# Patient Record
Sex: FEMALE | Race: WHITE | NOT HISPANIC OR LATINO
[De-identification: names, ages, dates, MRNs, and addresses within clinical notes are randomized per-mention and may not be internally consistent; named-entity substitution may affect disease eponyms.]

---

## 2017-02-21 ENCOUNTER — RESULT CHARGE (OUTPATIENT)
Age: 27
End: 2017-02-21

## 2017-02-21 ENCOUNTER — OTHER (OUTPATIENT)
Age: 27
End: 2017-02-21

## 2017-02-21 ENCOUNTER — APPOINTMENT (OUTPATIENT)
Dept: UROLOGY | Facility: CLINIC | Age: 27
End: 2017-02-21

## 2017-02-21 VITALS
HEART RATE: 70 BPM | SYSTOLIC BLOOD PRESSURE: 111 MMHG | BODY MASS INDEX: 22.08 KG/M2 | HEIGHT: 62 IN | TEMPERATURE: 98.2 F | WEIGHT: 120 LBS | DIASTOLIC BLOOD PRESSURE: 74 MMHG

## 2017-02-21 DIAGNOSIS — R35.1 NOCTURIA: ICD-10-CM

## 2017-02-21 DIAGNOSIS — Z80.3 FAMILY HISTORY OF MALIGNANT NEOPLASM OF BREAST: ICD-10-CM

## 2017-02-21 DIAGNOSIS — Z78.9 OTHER SPECIFIED HEALTH STATUS: ICD-10-CM

## 2017-02-21 DIAGNOSIS — R35.0 FREQUENCY OF MICTURITION: ICD-10-CM

## 2017-03-08 ENCOUNTER — APPOINTMENT (OUTPATIENT)
Dept: UROLOGY | Facility: HOSPITAL | Age: 27
End: 2017-03-08

## 2018-04-20 ENCOUNTER — RESULT REVIEW (OUTPATIENT)
Age: 28
End: 2018-04-20

## 2019-04-15 ENCOUNTER — RX RENEWAL (OUTPATIENT)
Age: 29
End: 2019-04-15

## 2019-04-25 ENCOUNTER — RECORD ABSTRACTING (OUTPATIENT)
Age: 29
End: 2019-04-25

## 2019-04-25 DIAGNOSIS — B00.9 HERPESVIRAL INFECTION, UNSPECIFIED: ICD-10-CM

## 2019-04-25 DIAGNOSIS — Q83.8 OTHER CONGENITAL MALFORMATIONS OF BREAST: ICD-10-CM

## 2019-04-25 DIAGNOSIS — Z82.49 FAMILY HISTORY OF ISCHEMIC HEART DISEASE AND OTHER DISEASES OF THE CIRCULATORY SYSTEM: ICD-10-CM

## 2019-04-25 LAB
CYTOLOGY CVX/VAG DOC THIN PREP: NORMAL
CYTOLOGY CVX/VAG DOC THIN PREP: NORMAL

## 2019-04-27 ENCOUNTER — RESULT CHARGE (OUTPATIENT)
Age: 29
End: 2019-04-27

## 2019-04-27 ENCOUNTER — APPOINTMENT (OUTPATIENT)
Dept: OBGYN | Facility: CLINIC | Age: 29
End: 2019-04-27
Payer: COMMERCIAL

## 2019-04-27 VITALS
HEIGHT: 62 IN | BODY MASS INDEX: 23.92 KG/M2 | WEIGHT: 130 LBS | DIASTOLIC BLOOD PRESSURE: 70 MMHG | SYSTOLIC BLOOD PRESSURE: 112 MMHG

## 2019-04-27 DIAGNOSIS — F40.298 OTHER SPECIFIED PHOBIA: ICD-10-CM

## 2019-04-27 DIAGNOSIS — B97.7 PAPILLOMAVIRUS AS THE CAUSE OF DISEASES CLASSIFIED ELSEWHERE: ICD-10-CM

## 2019-04-27 DIAGNOSIS — Z01.411 ENCOUNTER FOR GYNECOLOGICAL EXAMINATION (GENERAL) (ROUTINE) WITH ABNORMAL FINDINGS: ICD-10-CM

## 2019-04-27 LAB
BILIRUB UR QL STRIP: NORMAL
CLARITY UR: CLEAR
COLLECTION METHOD: NORMAL
GLUCOSE UR-MCNC: NORMAL
HCG UR QL: 0.2 EU/DL
HCG UR QL: NEGATIVE
HGB UR QL STRIP.AUTO: NORMAL
KETONES UR-MCNC: NORMAL
LEUKOCYTE ESTERASE UR QL STRIP: NORMAL
NITRITE UR QL STRIP: NORMAL
PH UR STRIP: 5.5
PROT UR STRIP-MCNC: NORMAL
QUALITY CONTROL: YES
SP GR UR STRIP: 1.02

## 2019-04-27 PROCEDURE — 81003 URINALYSIS AUTO W/O SCOPE: CPT | Mod: QW

## 2019-04-27 PROCEDURE — 99395 PREV VISIT EST AGE 18-39: CPT

## 2019-04-27 PROCEDURE — 81025 URINE PREGNANCY TEST: CPT

## 2019-04-27 NOTE — HISTORY OF PRESENT ILLNESS
[1 Year Ago] : 1 year ago [Good] : being in good health [Healthy Diet] : a healthy diet [Regular Exercise] : regular exercise [Last Pap ___] : Last cervical pap smear was [unfilled] [Reproductive Age] : is of reproductive age [Definite:  ___ (Date)] : the last menstrual period was [unfilled] [Normal Amount/Duration] : was of a normal amount and duration [Regular Cycle Intervals] : periods have been regular [Menarche Age: ____] : age at menarche was [unfilled] [Sexually Active] : is sexually active [Contraception] : uses contraception [Oral Contraceptives] : uses oral contraceptives [Male ___] : [unfilled] male [Weight Concerns] : no concerns with her weight [de-identified] : Pelvic US 10/21/2017

## 2019-04-29 LAB — HPV HIGH+LOW RISK DNA PNL CVX: NOT DETECTED

## 2019-05-01 LAB — CYTOLOGY CVX/VAG DOC THIN PREP: NORMAL

## 2019-05-05 ENCOUNTER — TRANSCRIPTION ENCOUNTER (OUTPATIENT)
Age: 29
End: 2019-05-05

## 2019-07-30 ENCOUNTER — RX RENEWAL (OUTPATIENT)
Age: 29
End: 2019-07-30

## 2019-09-16 ENCOUNTER — APPOINTMENT (OUTPATIENT)
Dept: ORTHOPEDIC SURGERY | Facility: CLINIC | Age: 29
End: 2019-09-16

## 2020-01-21 ENCOUNTER — TRANSCRIPTION ENCOUNTER (OUTPATIENT)
Age: 30
End: 2020-01-21

## 2020-02-25 ENCOUNTER — APPOINTMENT (OUTPATIENT)
Dept: OBGYN | Facility: CLINIC | Age: 30
End: 2020-02-25
Payer: COMMERCIAL

## 2020-02-25 VITALS
SYSTOLIC BLOOD PRESSURE: 104 MMHG | BODY MASS INDEX: 23.74 KG/M2 | HEIGHT: 62 IN | WEIGHT: 129 LBS | DIASTOLIC BLOOD PRESSURE: 70 MMHG

## 2020-02-25 DIAGNOSIS — R39.15 URGENCY OF URINATION: ICD-10-CM

## 2020-02-25 DIAGNOSIS — Z00.00 ENCOUNTER FOR GENERAL ADULT MEDICAL EXAMINATION W/OUT ABNORMAL FINDINGS: ICD-10-CM

## 2020-02-25 LAB
BILIRUB UR QL STRIP: NORMAL
GLUCOSE UR-MCNC: NORMAL
HCG UR QL: 0.2 EU/DL
HGB UR QL STRIP.AUTO: ABNORMAL
KETONES UR-MCNC: NORMAL
LEUKOCYTE ESTERASE UR QL STRIP: NORMAL
NITRITE UR QL STRIP: NORMAL
PH UR STRIP: 5.5
PROT UR STRIP-MCNC: NORMAL
SP GR UR STRIP: 1.01

## 2020-02-25 PROCEDURE — 99213 OFFICE O/P EST LOW 20 MIN: CPT

## 2020-02-25 PROCEDURE — 81003 URINALYSIS AUTO W/O SCOPE: CPT | Mod: QW

## 2020-02-25 NOTE — PHYSICAL EXAM
[Labia Majora] : labia major [Labia Minora] : labia minora [Normal] : clitoris [No Bleeding] : there was no active vaginal bleeding [Uterine Adnexae] : were not tender and not enlarged [FreeTextEntry4] : small amount white discharge,  cultues preformed.

## 2020-02-25 NOTE — HISTORY OF PRESENT ILLNESS
[1 Year Ago] : 1 year ago [Good] : being in good health [Healthy Diet] : a healthy diet [Regular Exercise] : regular exercise [Last Pap ___] : Last cervical pap smear was [unfilled] [Reproductive Age] : is of reproductive age [Menarche Age: ____] : age at menarche was [unfilled] [Sexually Active] : is sexually active [Contraception] : uses contraception [Oral Contraceptives] : uses oral contraceptives [Pregnancy History] : denies prior pregnancies

## 2020-02-25 NOTE — DISCUSSION/SUMMARY
[Combined Oral Contraception] : combined oral contraception [FreeTextEntry1] : Pt aware pending culture results and TVS any further tx.  PT scheduling TVS to further evaluate symptoms.  All the pt's questions and concerns were addressed.

## 2020-02-26 ENCOUNTER — APPOINTMENT (OUTPATIENT)
Dept: OBGYN | Facility: CLINIC | Age: 30
End: 2020-02-26
Payer: COMMERCIAL

## 2020-02-26 ENCOUNTER — ASOB RESULT (OUTPATIENT)
Age: 30
End: 2020-02-26

## 2020-02-26 LAB
APPEARANCE: CLEAR
BACTERIA: NEGATIVE
BILIRUBIN URINE: NEGATIVE
BLOOD URINE: ABNORMAL
COLOR: COLORLESS
GLUCOSE QUALITATIVE U: NEGATIVE
HYALINE CASTS: 1 /LPF
KETONES URINE: NEGATIVE
LEUKOCYTE ESTERASE URINE: NEGATIVE
MICROSCOPIC-UA: NORMAL
NITRITE URINE: NEGATIVE
PH URINE: 6
PROTEIN URINE: NEGATIVE
RED BLOOD CELLS URINE: 1 /HPF
SPECIFIC GRAVITY URINE: 1.01
SQUAMOUS EPITHELIAL CELLS: 0 /HPF
UROBILINOGEN URINE: NORMAL
WHITE BLOOD CELLS URINE: 1 /HPF

## 2020-02-26 PROCEDURE — 76830 TRANSVAGINAL US NON-OB: CPT

## 2020-02-27 LAB
BACTERIA UR CULT: NORMAL
C TRACH RRNA SPEC QL NAA+PROBE: NOT DETECTED
CANDIDA VAG CYTO: NOT DETECTED
G VAGINALIS+PREV SP MTYP VAG QL MICRO: NOT DETECTED
N GONORRHOEA RRNA SPEC QL NAA+PROBE: NOT DETECTED
SOURCE AMPLIFICATION: NORMAL
T VAGINALIS VAG QL WET PREP: NOT DETECTED

## 2020-03-04 ENCOUNTER — TRANSCRIPTION ENCOUNTER (OUTPATIENT)
Age: 30
End: 2020-03-04

## 2020-10-05 ENCOUNTER — APPOINTMENT (OUTPATIENT)
Dept: OBGYN | Facility: CLINIC | Age: 30
End: 2020-10-05
Payer: COMMERCIAL

## 2020-10-05 VITALS
TEMPERATURE: 97.3 F | WEIGHT: 127 LBS | DIASTOLIC BLOOD PRESSURE: 62 MMHG | SYSTOLIC BLOOD PRESSURE: 114 MMHG | BODY MASS INDEX: 23.37 KG/M2 | HEIGHT: 62 IN

## 2020-10-05 DIAGNOSIS — Z92.89 PERSONAL HISTORY OF OTHER MEDICAL TREATMENT: ICD-10-CM

## 2020-10-05 DIAGNOSIS — Z01.419 ENCOUNTER FOR GYNECOLOGICAL EXAMINATION (GENERAL) (ROUTINE) W/OUT ABNORMAL FINDINGS: ICD-10-CM

## 2020-10-05 PROCEDURE — 99395 PREV VISIT EST AGE 18-39: CPT

## 2020-10-08 LAB — HPV HIGH+LOW RISK DNA PNL CVX: NOT DETECTED

## 2020-10-25 PROBLEM — Z92.89 H/O PELVIC ULTRASOUND: Status: RESOLVED | Noted: 2019-04-25 | Resolved: 2020-10-25

## 2020-10-25 NOTE — HISTORY OF PRESENT ILLNESS
[N] : Patient denies prior pregnancies [Currently Active] : currently active [Men] : men [Vaginal] : vaginal [No] : No [TextBox_4] : 30-year-old G0 LMP 9/15/2020 presents for annual gynecological exam requesting to continue oral contraceptive\par \par She is failed multiple other oral contraceptives in the past and has tried over 4 different regimens\par \par She has complaints pain with sex-we discussed the differentials-causes \par \par she will add coconut oil and call for any increasing or persisting symptoms symptoms [PapSmeardate] : 4/27/2019 [TextBox_31] : negative [GonorrheaDate] : 2/25/2020 [TextBox_63] : not detected [ChlamydiaDate] : 2/25/2020 [TextBox_68] : not detected [HPVDate] : 4/27/2019 [TextBox_78] : not detected [LMPDate] : 09/15/202 [PGHxTotal] : 0 [TextBox_6] : 09/15/2020 [FreeTextEntry1] : 09/15/2020

## 2020-10-25 NOTE — COUNSELING
[Nutrition/ Exercise/ Weight Management] : nutrition, exercise, weight management [Vitamins/Supplements] : vitamins/supplements [Sunscreen] : sunscreen [Breast Self Exam] : breast self exam [Contraception/ Emergency Contraception/ Safe Sexual Practices] : contraception, emergency contraception, safe sexual practices [STD (testing, results, tx)] : STD (testing, results, tx)

## 2020-10-25 NOTE — PHYSICAL EXAM
[Appropriately responsive] : appropriately responsive [Alert] : alert [No Acute Distress] : no acute distress [No Lymphadenopathy] : no lymphadenopathy [Regular Rate Rhythm] : regular rate rhythm [No Murmurs] : no murmurs [Clear to Auscultation B/L] : clear to auscultation bilaterally [Soft] : soft [Non-tender] : non-tender [Non-distended] : non-distended [No HSM] : No HSM [No Lesions] : no lesions [No Mass] : no mass [Oriented x3] : oriented x3 [Examination Of The Breasts] : a normal appearance [Normal] : normal [No Masses] : no breast masses were palpable

## 2020-11-23 LAB — CYTOLOGY CVX/VAG DOC THIN PREP: NORMAL

## 2020-12-23 PROBLEM — Z01.419 ENCOUNTER FOR ANNUAL ROUTINE GYNECOLOGICAL EXAMINATION: Status: RESOLVED | Noted: 2020-10-05 | Resolved: 2020-12-23

## 2021-02-13 ENCOUNTER — TRANSCRIPTION ENCOUNTER (OUTPATIENT)
Age: 31
End: 2021-02-13

## 2021-04-22 ENCOUNTER — TRANSCRIPTION ENCOUNTER (OUTPATIENT)
Age: 31
End: 2021-04-22

## 2021-05-01 ENCOUNTER — TRANSCRIPTION ENCOUNTER (OUTPATIENT)
Age: 31
End: 2021-05-01

## 2021-05-12 ENCOUNTER — TRANSCRIPTION ENCOUNTER (OUTPATIENT)
Age: 31
End: 2021-05-12

## 2021-05-18 ENCOUNTER — APPOINTMENT (OUTPATIENT)
Dept: INTERNAL MEDICINE | Facility: CLINIC | Age: 31
End: 2021-05-18
Payer: COMMERCIAL

## 2021-05-18 VITALS
RESPIRATION RATE: 16 BRPM | OXYGEN SATURATION: 99 % | HEART RATE: 77 BPM | SYSTOLIC BLOOD PRESSURE: 110 MMHG | BODY MASS INDEX: 23.6 KG/M2 | WEIGHT: 125 LBS | DIASTOLIC BLOOD PRESSURE: 70 MMHG | HEIGHT: 61 IN | TEMPERATURE: 99.1 F

## 2021-05-18 DIAGNOSIS — L60.0 INGROWING NAIL: ICD-10-CM

## 2021-05-18 DIAGNOSIS — R21 RASH AND OTHER NONSPECIFIC SKIN ERUPTION: ICD-10-CM

## 2021-05-18 PROCEDURE — 99072 ADDL SUPL MATRL&STAF TM PHE: CPT

## 2021-05-18 PROCEDURE — 99204 OFFICE O/P NEW MOD 45 MIN: CPT

## 2021-05-18 NOTE — HEALTH RISK ASSESSMENT
[Monthly or less (1 pt)] : Monthly or less (1 point) [1 or 2 (0 pts)] : 1 or 2 (0 points) [No] : In the past 12 months have you used drugs other than those required for medical reasons? No [0] : 2) Feeling down, depressed, or hopeless: Not at all (0) [] : No [KFD4Mxzrb] : 0

## 2021-05-18 NOTE — HISTORY OF PRESENT ILLNESS
[FreeTextEntry8] : Ms. FLORY PERES  is    31 year female . \par pt. is here today with a c/o rash on her chest and upper back.\par pt. stated she applied tanning lotion and put her fitting sweat shirt on later in the day she noticed reddish rash.\par she have been applying hydrocortisone and taking  Benadryl, it has helped a little.\par she also c/o of swelling of the right big toe nail bed since end of april .\par she have been to the urgent care and they have prescribed clindamycin 300 mg tid for 5 days which didn't help.\par she is asking for referral  to see  podiatrist.

## 2021-05-18 NOTE — PHYSICAL EXAM
[Normal] : normal rate, regular rhythm, normal S1 and S2 and no murmur heard [de-identified] : mild swelling of the surrounding are of the right big toe nail bed.right big toe nail looks yellow. [de-identified] : reddish maculopapular eruption on the chest and upper back.

## 2021-05-18 NOTE — REVIEW OF SYSTEMS
[Itching] : Itching [Skin Rash] : skin rash [Negative] : Respiratory [de-identified] : swelling of the right big toe nail bed.

## 2021-05-18 NOTE — ASSESSMENT
[FreeTextEntry1] : ingrown toe nail infection:\par will treat with keflex 500 mg bid for 7 days .\par will send a referral to podiatry for evaluation.\par \par Rash:\par secondary to tanning lotion.\par advised pt. to avoid the lotion .\par apply calamine lotion bid and alovera gel .\par continue Benadryl as needed for itching.\par \par

## 2021-05-26 ENCOUNTER — TRANSCRIPTION ENCOUNTER (OUTPATIENT)
Age: 31
End: 2021-05-26

## 2021-05-30 ENCOUNTER — TRANSCRIPTION ENCOUNTER (OUTPATIENT)
Age: 31
End: 2021-05-30

## 2021-06-02 ENCOUNTER — TRANSCRIPTION ENCOUNTER (OUTPATIENT)
Age: 31
End: 2021-06-02

## 2021-06-06 ENCOUNTER — TRANSCRIPTION ENCOUNTER (OUTPATIENT)
Age: 31
End: 2021-06-06

## 2021-06-25 ENCOUNTER — TRANSCRIPTION ENCOUNTER (OUTPATIENT)
Age: 31
End: 2021-06-25

## 2021-07-05 ENCOUNTER — TRANSCRIPTION ENCOUNTER (OUTPATIENT)
Age: 31
End: 2021-07-05

## 2021-07-30 ENCOUNTER — TRANSCRIPTION ENCOUNTER (OUTPATIENT)
Age: 31
End: 2021-07-30

## 2021-08-01 ENCOUNTER — TRANSCRIPTION ENCOUNTER (OUTPATIENT)
Age: 31
End: 2021-08-01

## 2021-08-24 ENCOUNTER — TRANSCRIPTION ENCOUNTER (OUTPATIENT)
Age: 31
End: 2021-08-24

## 2021-09-02 ENCOUNTER — APPOINTMENT (OUTPATIENT)
Dept: INTERNAL MEDICINE | Facility: CLINIC | Age: 31
End: 2021-09-02
Payer: COMMERCIAL

## 2021-09-02 DIAGNOSIS — T50.B95A ADVERSE EFFECT OF OTHER VIRAL VACCINES, INITIAL ENCOUNTER: ICD-10-CM

## 2021-09-02 PROCEDURE — 99203 OFFICE O/P NEW LOW 30 MIN: CPT | Mod: 95

## 2021-09-03 ENCOUNTER — TRANSCRIPTION ENCOUNTER (OUTPATIENT)
Age: 31
End: 2021-09-03

## 2021-09-03 PROBLEM — T50.B95A ADVERSE EFFECT OF COVID-19 VACCINE: Status: ACTIVE | Noted: 2021-09-03

## 2021-09-03 NOTE — HISTORY OF PRESENT ILLNESS
[Home] : at home, [unfilled] , at the time of the visit. [Medical Office: ([facility])___] : at the medical office located in  [Verbal consent obtained from patient] : the patient, [unfilled] [FreeTextEntry8] : Ms. PERES seen today for s/e to the covid vaccine.\par pt. stated she got her 2 dose of pfizer vaccine yesterday, she was very anxious to get the vaccine and right after she got the vaccine she was lightheaded , sweating , vomited .\par She was taken to the ER, then she went home with her dad after one hr.\par Today she is still filling little nauseous, body ache. no fever.\par \par

## 2021-09-03 NOTE — REVIEW OF SYSTEMS
[Fatigue] : fatigue [Nausea] : nausea [Muscle Pain] : muscle pain [Negative] : Integumentary [Fever] : no fever [Chills] : no chills [Hot Flashes] : no hot flashes [Night Sweats] : no night sweats [Abdominal Pain] : no abdominal pain [Diarrhea] : diarrhea [Vomiting] : no vomiting [Heartburn] : no heartburn

## 2021-09-03 NOTE — ASSESSMENT
[FreeTextEntry1] : Ms. PERES seen today for s/e to the covid vaccine.\par She got her 2 dose of pfizer vaccine yesterday and  she was lightheaded , sweating and she  vomited right after.\par She was taken to the ER,pt. felt little better went home with her dad.\par Today she is still filling little nauseous, body ache. no fever, she is not feeling well.\par She is concern if she is allergic to the vaccine.\par Reassurance given to the pt.\par she had tolerated the first dose , didn't had any reaction , except the injection site was hurting her for 2 to 3 wks.\par she have no rash , itching, coughing , SOB , swelling of tongue.\par advised to increase fluid intake.\par Tylenol prn for fever, body ache , jt, pain.\par Avoid oily, spicy food.\par

## 2021-09-23 ENCOUNTER — TRANSCRIPTION ENCOUNTER (OUTPATIENT)
Age: 31
End: 2021-09-23

## 2021-10-04 ENCOUNTER — ASOB RESULT (OUTPATIENT)
Age: 31
End: 2021-10-04

## 2021-10-04 ENCOUNTER — NON-APPOINTMENT (OUTPATIENT)
Age: 31
End: 2021-10-04

## 2021-10-04 ENCOUNTER — APPOINTMENT (OUTPATIENT)
Dept: OBGYN | Facility: CLINIC | Age: 31
End: 2021-10-04
Payer: COMMERCIAL

## 2021-10-04 VITALS
BODY MASS INDEX: 23.55 KG/M2 | SYSTOLIC BLOOD PRESSURE: 108 MMHG | HEIGHT: 62 IN | DIASTOLIC BLOOD PRESSURE: 64 MMHG | WEIGHT: 128 LBS

## 2021-10-04 DIAGNOSIS — R10.2 PELVIC AND PERINEAL PAIN: ICD-10-CM

## 2021-10-04 DIAGNOSIS — Z32.02 ENCOUNTER FOR PREGNANCY TEST, RESULT NEGATIVE: ICD-10-CM

## 2021-10-04 PROCEDURE — 81003 URINALYSIS AUTO W/O SCOPE: CPT | Mod: QW

## 2021-10-04 PROCEDURE — 81025 URINE PREGNANCY TEST: CPT

## 2021-10-04 PROCEDURE — 99213 OFFICE O/P EST LOW 20 MIN: CPT | Mod: 25

## 2021-10-04 PROCEDURE — 76830 TRANSVAGINAL US NON-OB: CPT

## 2021-10-05 ENCOUNTER — APPOINTMENT (OUTPATIENT)
Dept: INTERNAL MEDICINE | Facility: CLINIC | Age: 31
End: 2021-10-05
Payer: COMMERCIAL

## 2021-10-05 VITALS
RESPIRATION RATE: 16 BRPM | DIASTOLIC BLOOD PRESSURE: 80 MMHG | BODY MASS INDEX: 23.55 KG/M2 | HEART RATE: 85 BPM | HEIGHT: 62 IN | WEIGHT: 128 LBS | TEMPERATURE: 98.3 F | OXYGEN SATURATION: 98 % | SYSTOLIC BLOOD PRESSURE: 112 MMHG

## 2021-10-05 DIAGNOSIS — N30.01 ACUTE CYSTITIS WITH HEMATURIA: ICD-10-CM

## 2021-10-05 DIAGNOSIS — Z23 ENCOUNTER FOR IMMUNIZATION: ICD-10-CM

## 2021-10-05 PROBLEM — Z32.02 ENCOUNTER FOR PREGNANCY TEST, RESULT NEGATIVE: Status: ACTIVE | Noted: 2021-10-05

## 2021-10-05 LAB
BILIRUB UR QL STRIP: NEGATIVE
BILIRUB UR QL STRIP: NORMAL
CLARITY UR: NORMAL
COLLECTION METHOD: NORMAL
GLUCOSE UR-MCNC: NEGATIVE
GLUCOSE UR-MCNC: NORMAL
HCG UR QL: 0.2 EU/DL
HCG UR QL: 0.2 EU/DL
HCG UR QL: NEGATIVE
HGB UR QL STRIP.AUTO: ABNORMAL
HGB UR QL STRIP.AUTO: NORMAL
KETONES UR-MCNC: NEGATIVE
KETONES UR-MCNC: NORMAL
LEUKOCYTE ESTERASE UR QL STRIP: NEGATIVE
LEUKOCYTE ESTERASE UR QL STRIP: NORMAL
NITRITE UR QL STRIP: NEGATIVE
NITRITE UR QL STRIP: NORMAL
PH UR STRIP: 5
PH UR STRIP: 5.5
PROT UR STRIP-MCNC: NEGATIVE
PROT UR STRIP-MCNC: NORMAL
QUALITY CONTROL: YES
SP GR UR STRIP: 1.03
SP GR UR STRIP: 1.03

## 2021-10-05 PROCEDURE — 99214 OFFICE O/P EST MOD 30 MIN: CPT | Mod: 25

## 2021-10-05 PROCEDURE — 81003 URINALYSIS AUTO W/O SCOPE: CPT | Mod: QW

## 2021-10-05 NOTE — HISTORY OF PRESENT ILLNESS
[FreeTextEntry8] : Ms. PERES  presents with a c/o pain in the suprapubic area.\par she tells me that Saturday she went out to eat dinner with her friends where she had sharp pain in the lower abdomen , she also vomited once.\par she went to see her GYN , had a trans vaginal US , everything was normal.\par She says she don't have any pain, just feels pressure in the lower abdomen.\par also c/o pain in the neck going down to both side if the neck and up to the back of the head , gives her HA everday.\par It all started 2 wks ago.\par

## 2021-10-05 NOTE — REVIEW OF SYSTEMS
[Fever] : no fever [Chills] : no chills [Abdominal Pain] : abdominal pain [Nausea] : nausea [Constipation] : no constipation [Diarrhea] : diarrhea [Vomiting] : vomiting [Heartburn] : no heartburn [Melena] : no melena [Dysuria] : no dysuria [Hematuria] : no hematuria [Frequency] : no frequency [Vaginal Discharge] : vaginal discharge

## 2021-10-05 NOTE — DISCUSSION/SUMMARY
[FreeTextEntry1] : We reviewed her pelvic sonogram which does now show an etiology for the symptoms she is describing. I explained that taking 2 OCPs also would not cause her symptoms, but did advise that in the event of missed doses she should use a back-up method for 1 week to prevent pregnancy. \par I advised GI evaluation if her symptoms persist.\par Will send urine culture to rule out UTI.

## 2021-10-05 NOTE — PHYSICAL EXAM
[Soft] : abdomen soft [Non-distended] : non-distended [No Masses] : no abdominal mass palpated [Normal Bowel Sounds] : normal bowel sounds [Normal] : no joint swelling and grossly normal strength and tone [de-identified] : +pressure in the suprapubic area.

## 2021-10-05 NOTE — REVIEW OF SYSTEMS
[Fever] : no fever [Chills] : no chills [Abdominal Pain] : abdominal pain [Diarrhea] : diarrhea [Vomiting] : vomiting

## 2021-10-05 NOTE — HISTORY OF PRESENT ILLNESS
[Patient reported PAP Smear was normal] : Patient reported PAP Smear was normal [Gonorrhea test offered] : Gonorrhea test offered [Chlamydia test offered] : Chlamydia test offered [HPV test offered] : HPV test offered [N] : Patient reports normal menses [Oral Contraceptive] : uses oral contraception pills [Y] : Patient is sexually active [Monogamous (Male Partner)] : is monogamous with a male partner [Menarche Age: ____] : age at menarche was [unfilled] [Currently Active] : currently active [Men] : men [Vaginal] : vaginal [No] : No [Yes] : Yes [TextBox_4] : Pt presents for follow up sono results. C/o suprapubic pressure [PapSmeardate] : 10/05/20 [TextBox_31] : neg [GonorrheaDate] : 02/25/20 [TextBox_63] : neg [ChlamydiaDate] : 02/25/20 [TextBox_68] : neg [HPVDate] : 10/05/20 [TextBox_78] : neg [LMPDate] : 09/21/21 [PGHxTotal] : 0 [FreeTextEntry1] : 09/21/21

## 2021-10-05 NOTE — ASSESSMENT
[FreeTextEntry1] : # acute cystitis with hematuria:\par prescribed ciprofloxacin 500 mg bid for 10 days.\par drink plenty of water.\par \par # cervical radiculitis:\par prescribed diclofenac ointment 1 % apply locally 2 to 3 times a day/\par warm compression.\par demonstrated neck muscle strengthening exercise.\par avoid pillow if sleeping on back.\par

## 2021-10-08 ENCOUNTER — TRANSCRIPTION ENCOUNTER (OUTPATIENT)
Age: 31
End: 2021-10-08

## 2021-10-09 ENCOUNTER — NON-APPOINTMENT (OUTPATIENT)
Age: 31
End: 2021-10-09

## 2021-10-09 ENCOUNTER — APPOINTMENT (OUTPATIENT)
Dept: OBGYN | Facility: CLINIC | Age: 31
End: 2021-10-09
Payer: COMMERCIAL

## 2021-10-09 VITALS
TEMPERATURE: 97.5 F | SYSTOLIC BLOOD PRESSURE: 98 MMHG | DIASTOLIC BLOOD PRESSURE: 62 MMHG | WEIGHT: 127.38 LBS | BODY MASS INDEX: 23.44 KG/M2 | HEIGHT: 62 IN

## 2021-10-09 DIAGNOSIS — Z30.41 ENCOUNTER FOR SURVEILLANCE OF CONTRACEPTIVE PILLS: ICD-10-CM

## 2021-10-09 DIAGNOSIS — Z01.419 ENCOUNTER FOR GYNECOLOGICAL EXAMINATION (GENERAL) (ROUTINE) W/OUT ABNORMAL FINDINGS: ICD-10-CM

## 2021-10-09 DIAGNOSIS — R92.8 OTHER ABNORMAL AND INCONCLUSIVE FINDINGS ON DIAGNOSTIC IMAGING OF BREAST: ICD-10-CM

## 2021-10-09 DIAGNOSIS — N60.11 DIFFUSE CYSTIC MASTOPATHY OF RIGHT BREAST: ICD-10-CM

## 2021-10-09 PROCEDURE — 99213 OFFICE O/P EST LOW 20 MIN: CPT | Mod: 25

## 2021-10-09 PROCEDURE — 99395 PREV VISIT EST AGE 18-39: CPT

## 2021-10-09 NOTE — PLAN
[FreeTextEntry1] : Importance of follow-up on any abnormal breast imaging was reviewed with the patient in the early detection and prevention of breast cancer.  Patient verbalized good understanding.  She will follow up in January for her repeat imaging.  Option of consulting a breast specialist was discussed.  Pt declined at this time

## 2021-10-09 NOTE — HISTORY OF PRESENT ILLNESS
[Patient reported mammogram was normal] : Patient reported mammogram was normal [Patient reported breast sonogram was normal] : Patient reported breast sonogram was normal [HPV test offered] : HPV test offered [N] : Patient reports normal menses [Oral Contraceptive] : uses oral contraception pills [Y] : Patient is sexually active [Monogamous (Female Partner)] : is monogamous with a female partner [Menarche Age: ____] : age at menarche was [unfilled] [Currently Active] : currently active [Women] : women [No] : No [Patient refuses STI testing] : Patient refuses STI testing [TextBox_4] : Pt presents for an annual exam.\par \par Patient states she had a mammogram in July 2001 with a screening breast ultrasound at [place] in [facility] and was notified to follow-up for 6-month follow-up mammosono for the right breast\par \par Patient denies any lump on her self breast exam pain in the right upper outer quadrant right breast\par \par Patient states she will notify facility and have them send results and will also call-patient was given prescriptions for the 6-month follow-up imaging and advised to call immediately upon completion to follow-up to make sure we get the results [Mammogramdate] : 07/28/2021 [BreastSonogramDate] : 07/28/2021 [PapSmeardate] : 10/05/2020 [TextBox_31] : Negative [HPVDate] : 10/05/2020 [TextBox_78] : Negative [LMPDate] : 09/21/2021 [FreeTextEntry1] : 09/21/2021

## 2021-10-09 NOTE — PHYSICAL EXAM
[Appropriately responsive] : appropriately responsive [Alert] : alert [No Acute Distress] : no acute distress [No Lymphadenopathy] : no lymphadenopathy [Regular Rate Rhythm] : regular rate rhythm [No Murmurs] : no murmurs [Clear to Auscultation B/L] : clear to auscultation bilaterally [Soft] : soft [Non-tender] : non-tender [Non-distended] : non-distended [No HSM] : No HSM [No Lesions] : no lesions [No Mass] : no mass [Oriented x3] : oriented x3 [Examination Of The Breasts] : a normal appearance [No Masses] : no breast masses were palpable [Labia Minora] : normal [Labia Majora] : normal [Normal] : normal [Uterine Adnexae] : normal [Diffuse Fibrous Tissue In The Right Breast] : fibrocystic changes

## 2021-10-11 LAB — HPV HIGH+LOW RISK DNA PNL CVX: NOT DETECTED

## 2021-10-15 ENCOUNTER — NON-APPOINTMENT (OUTPATIENT)
Age: 31
End: 2021-10-15

## 2021-10-16 ENCOUNTER — TRANSCRIPTION ENCOUNTER (OUTPATIENT)
Age: 31
End: 2021-10-16

## 2021-10-19 ENCOUNTER — APPOINTMENT (OUTPATIENT)
Dept: FAMILY MEDICINE | Facility: CLINIC | Age: 31
End: 2021-10-19
Payer: COMMERCIAL

## 2021-10-19 VITALS
OXYGEN SATURATION: 98 % | WEIGHT: 129 LBS | TEMPERATURE: 98.3 F | RESPIRATION RATE: 16 BRPM | BODY MASS INDEX: 23.74 KG/M2 | DIASTOLIC BLOOD PRESSURE: 70 MMHG | HEIGHT: 62 IN | HEART RATE: 82 BPM | SYSTOLIC BLOOD PRESSURE: 104 MMHG

## 2021-10-19 PROCEDURE — 99213 OFFICE O/P EST LOW 20 MIN: CPT

## 2021-10-19 RX ORDER — DESOGESTREL AND ETHINYL ESTRADIOL 7 DAYS X 3
0.1/0.125/0.15 KIT ORAL DAILY
Qty: 3 | Refills: 0 | Status: COMPLETED | COMMUNITY
Start: 2019-07-30 | End: 2021-10-19

## 2021-10-19 RX ORDER — DESOGESTREL AND ETHINYL ESTRADIOL 7 DAYS X 3
0.1/0.125/0.15 KIT ORAL DAILY
Qty: 3 | Refills: 3 | Status: COMPLETED | COMMUNITY
Start: 2020-10-05 | End: 2021-10-19

## 2021-10-19 RX ORDER — CEPHALEXIN 500 MG/1
500 TABLET ORAL
Qty: 14 | Refills: 0 | Status: COMPLETED | COMMUNITY
Start: 2021-05-18 | End: 2021-10-19

## 2021-10-19 RX ORDER — CLINDAMYCIN HYDROCHLORIDE 300 MG/1
300 CAPSULE ORAL
Qty: 15 | Refills: 0 | Status: COMPLETED | COMMUNITY
Start: 2021-05-01 | End: 2021-10-19

## 2021-10-19 RX ORDER — DICLOFENAC SODIUM 1% 10 MG/G
1 GEL TOPICAL
Qty: 1 | Refills: 0 | Status: COMPLETED | COMMUNITY
Start: 2021-10-05 | End: 2021-10-19

## 2021-10-19 RX ORDER — CYCLOBENZAPRINE HYDROCHLORIDE 10 MG/1
10 TABLET, FILM COATED ORAL
Qty: 10 | Refills: 0 | Status: COMPLETED | COMMUNITY
Start: 2021-10-05 | End: 2021-10-19

## 2021-10-19 RX ORDER — CIPROFLOXACIN HYDROCHLORIDE 500 MG/1
500 TABLET, FILM COATED ORAL
Qty: 10 | Refills: 0 | Status: COMPLETED | COMMUNITY
Start: 2021-10-05 | End: 2021-10-19

## 2021-10-19 RX ORDER — CEPHALEXIN 500 MG/1
500 CAPSULE ORAL
Qty: 14 | Refills: 0 | Status: COMPLETED | COMMUNITY
Start: 2021-05-18 | End: 2021-10-19

## 2021-10-19 NOTE — HISTORY OF PRESENT ILLNESS
[FreeTextEntry8] : Patient presents with persisting suprapubic pressure and discomfort.  She was cleared by gyn, but she states that her sxs have persisted.  She did have an internal US at that time, which was normal.  She was placed on cipro at that time.  She states that she does have a history of hematuria, for which she has been evaluated by urology in the past.  She states that she feels pressure daily.  she is passing stool normally, and she denies any fever, nausea or vomiting.

## 2021-10-19 NOTE — PHYSICAL EXAM
[No CVA Tenderness] : no CVA  tenderness [Normal] : affect was normal and insight and judgment were intact [Soft] : abdomen soft [No HSM] : no HSM [Normal Bowel Sounds] : normal bowel sounds [de-identified] : mild tenderness to suprapublic pressure.  no rebound tenderness or guarding.

## 2021-10-22 ENCOUNTER — NON-APPOINTMENT (OUTPATIENT)
Age: 31
End: 2021-10-22

## 2021-10-22 LAB
APPEARANCE: CLEAR
BACTERIA: NEGATIVE
BILIRUBIN URINE: NEGATIVE
BLOOD URINE: ABNORMAL
COLOR: YELLOW
GLUCOSE QUALITATIVE U: NEGATIVE
HYALINE CASTS: 1 /LPF
KETONES URINE: NEGATIVE
LEUKOCYTE ESTERASE URINE: NEGATIVE
MICROSCOPIC-UA: NORMAL
NITRITE URINE: NEGATIVE
PH URINE: 6
PROTEIN URINE: NEGATIVE
RED BLOOD CELLS URINE: 7 /HPF
SPECIFIC GRAVITY URINE: 1.02
SQUAMOUS EPITHELIAL CELLS: 1 /HPF
UROBILINOGEN URINE: NORMAL
WHITE BLOOD CELLS URINE: 1 /HPF

## 2021-10-23 LAB — CYTOLOGY CVX/VAG DOC THIN PREP: NORMAL

## 2021-10-25 LAB — BACTERIA UR CULT: ABNORMAL

## 2021-11-03 ENCOUNTER — APPOINTMENT (OUTPATIENT)
Dept: INTERNAL MEDICINE | Facility: CLINIC | Age: 31
End: 2021-11-03
Payer: COMMERCIAL

## 2021-11-03 VITALS
RESPIRATION RATE: 16 BRPM | SYSTOLIC BLOOD PRESSURE: 114 MMHG | HEART RATE: 69 BPM | WEIGHT: 130 LBS | DIASTOLIC BLOOD PRESSURE: 78 MMHG | HEIGHT: 61 IN | TEMPERATURE: 98.3 F | BODY MASS INDEX: 24.55 KG/M2 | OXYGEN SATURATION: 100 %

## 2021-11-03 DIAGNOSIS — R39.9 UNSPECIFIED SYMPTOMS AND SIGNS INVOLVING THE GENITOURINARY SYSTEM: ICD-10-CM

## 2021-11-03 DIAGNOSIS — R11.0 NAUSEA: ICD-10-CM

## 2021-11-03 LAB
BILIRUB UR QL STRIP: NEGATIVE
CLARITY UR: CLEAR
GLUCOSE UR-MCNC: NEGATIVE
HCG UR QL: 0.2 EU/DL
HGB UR QL STRIP.AUTO: NORMAL
KETONES UR-MCNC: NEGATIVE
LEUKOCYTE ESTERASE UR QL STRIP: NEGATIVE
NITRITE UR QL STRIP: NEGATIVE
PH UR STRIP: 5.5
PROT UR STRIP-MCNC: NEGATIVE
SP GR UR STRIP: 1.02

## 2021-11-03 PROCEDURE — 81003 URINALYSIS AUTO W/O SCOPE: CPT | Mod: QW

## 2021-11-03 PROCEDURE — 99214 OFFICE O/P EST MOD 30 MIN: CPT | Mod: 25

## 2021-11-03 RX ORDER — NITROFURANTOIN MACROCRYSTALS 100 MG/1
100 CAPSULE ORAL
Qty: 10 | Refills: 0 | Status: DISCONTINUED | COMMUNITY
Start: 2021-10-22 | End: 2021-11-03

## 2021-11-04 ENCOUNTER — APPOINTMENT (OUTPATIENT)
Dept: ULTRASOUND IMAGING | Facility: CLINIC | Age: 31
End: 2021-11-04

## 2021-11-05 NOTE — HISTORY OF PRESENT ILLNESS
[FreeTextEntry1] : Patient is here for follow-up on UTI/\par  [de-identified] : Ms. PERES is here as she continues to have pressure in the lower abdomen.\par Patient stated her symptoms is better than before.\par She was seen by the PA Ms. Gonzalez on October 19 and was prescribed nitrofurantoin as per sensitivity of the urine culture result.\par Patient stated that helped a little bit but she is still feeling uncomfortable.\par She also complains of feeling nauseous, no vomiting.\par

## 2021-11-05 NOTE — ASSESSMENT
[FreeTextEntry1] : I reviewed the urine culture result which shows mild sensitivity to nitrofurantoin.\par It is highly sensitive to Bactrim and Augmentin.\par Patient is allergic to amoxicillin.\par Prescribed Bactrim DS twice a day for 10 days.\par #Nausea:\par Prescribed Zofran 4 mg 1 tablet twice daily as needed.

## 2021-11-18 ENCOUNTER — TRANSCRIPTION ENCOUNTER (OUTPATIENT)
Age: 31
End: 2021-11-18

## 2022-01-02 ENCOUNTER — TRANSCRIPTION ENCOUNTER (OUTPATIENT)
Age: 32
End: 2022-01-02

## 2022-01-05 ENCOUNTER — TRANSCRIPTION ENCOUNTER (OUTPATIENT)
Age: 32
End: 2022-01-05

## 2022-01-05 DIAGNOSIS — R59.1 GENERALIZED ENLARGED LYMPH NODES: ICD-10-CM

## 2022-01-05 DIAGNOSIS — J02.9 ACUTE PHARYNGITIS, UNSPECIFIED: ICD-10-CM

## 2022-01-06 DIAGNOSIS — J02.0 STREPTOCOCCAL PHARYNGITIS: ICD-10-CM

## 2022-01-06 DIAGNOSIS — A38.8 STREPTOCOCCAL PHARYNGITIS: ICD-10-CM

## 2022-01-11 ENCOUNTER — APPOINTMENT (OUTPATIENT)
Dept: OBGYN | Facility: CLINIC | Age: 32
End: 2022-01-11

## 2022-01-20 ENCOUNTER — TRANSCRIPTION ENCOUNTER (OUTPATIENT)
Age: 32
End: 2022-01-20

## 2022-02-04 ENCOUNTER — TRANSCRIPTION ENCOUNTER (OUTPATIENT)
Age: 32
End: 2022-02-04

## 2022-02-23 ENCOUNTER — NON-APPOINTMENT (OUTPATIENT)
Age: 32
End: 2022-02-23

## 2022-02-24 ENCOUNTER — TRANSCRIPTION ENCOUNTER (OUTPATIENT)
Age: 32
End: 2022-02-24

## 2022-03-04 ENCOUNTER — TRANSCRIPTION ENCOUNTER (OUTPATIENT)
Age: 32
End: 2022-03-04

## 2022-03-04 ENCOUNTER — APPOINTMENT (OUTPATIENT)
Dept: OBGYN | Facility: CLINIC | Age: 32
End: 2022-03-04
Payer: COMMERCIAL

## 2022-03-04 VITALS
BODY MASS INDEX: 24.92 KG/M2 | DIASTOLIC BLOOD PRESSURE: 70 MMHG | HEIGHT: 61 IN | SYSTOLIC BLOOD PRESSURE: 116 MMHG | WEIGHT: 132 LBS

## 2022-03-04 DIAGNOSIS — Z31.69 ENCOUNTER FOR OTHER GENERAL COUNSELING AND ADVICE ON PROCREATION: ICD-10-CM

## 2022-03-04 DIAGNOSIS — N83.209 UNSPECIFIED OVARIAN CYST, UNSPECIFIED SIDE: ICD-10-CM

## 2022-03-04 PROCEDURE — 99202 OFFICE O/P NEW SF 15 MIN: CPT

## 2022-04-12 ENCOUNTER — APPOINTMENT (OUTPATIENT)
Dept: INTERNAL MEDICINE | Facility: CLINIC | Age: 32
End: 2022-04-12
Payer: COMMERCIAL

## 2022-04-12 VITALS
TEMPERATURE: 98.4 F | HEIGHT: 60.5 IN | DIASTOLIC BLOOD PRESSURE: 76 MMHG | SYSTOLIC BLOOD PRESSURE: 111 MMHG | RESPIRATION RATE: 16 BRPM | BODY MASS INDEX: 25.63 KG/M2 | HEART RATE: 72 BPM | OXYGEN SATURATION: 98 % | WEIGHT: 134 LBS

## 2022-04-12 DIAGNOSIS — R53.83 OTHER FATIGUE: ICD-10-CM

## 2022-04-12 PROCEDURE — 36415 COLL VENOUS BLD VENIPUNCTURE: CPT

## 2022-04-12 PROCEDURE — 99213 OFFICE O/P EST LOW 20 MIN: CPT | Mod: 25

## 2022-04-12 RX ORDER — ONDANSETRON 4 MG/1
4 TABLET ORAL TWICE DAILY
Qty: 10 | Refills: 0 | Status: COMPLETED | COMMUNITY
Start: 2021-11-03 | End: 2022-04-12

## 2022-04-12 RX ORDER — AZITHROMYCIN 250 MG/1
250 TABLET, FILM COATED ORAL
Qty: 1 | Refills: 0 | Status: COMPLETED | COMMUNITY
Start: 2022-01-05 | End: 2022-04-12

## 2022-04-12 RX ORDER — SULFAMETHOXAZOLE AND TRIMETHOPRIM 800; 160 MG/1; MG/1
800-160 TABLET ORAL TWICE DAILY
Qty: 20 | Refills: 0 | Status: COMPLETED | COMMUNITY
Start: 2021-11-03 | End: 2022-04-12

## 2022-04-12 RX ORDER — BENZOCAINE AND MENTHOL, UNSPECIFIED FORM 15; 2.3 MG/1; MG/1
15-2.3 LOZENGE ORAL EVERY 4 HOURS
Qty: 40 | Refills: 0 | Status: COMPLETED | COMMUNITY
Start: 2022-01-06 | End: 2022-04-12

## 2022-04-13 ENCOUNTER — TRANSCRIPTION ENCOUNTER (OUTPATIENT)
Age: 32
End: 2022-04-13

## 2022-04-13 LAB
25(OH)D3 SERPL-MCNC: 21.3 NG/ML
BASOPHILS # BLD AUTO: 0.04 K/UL
BASOPHILS NFR BLD AUTO: 0.6 %
EOSINOPHIL # BLD AUTO: 0.13 K/UL
EOSINOPHIL NFR BLD AUTO: 1.9 %
HCT VFR BLD CALC: 40.1 %
HGB BLD-MCNC: 12.8 G/DL
IMM GRANULOCYTES NFR BLD AUTO: 0.3 %
LYMPHOCYTES # BLD AUTO: 2.37 K/UL
LYMPHOCYTES NFR BLD AUTO: 34.3 %
MAN DIFF?: NORMAL
MCHC RBC-ENTMCNC: 29.8 PG
MCHC RBC-ENTMCNC: 31.9 GM/DL
MCV RBC AUTO: 93.3 FL
MONOCYTES # BLD AUTO: 0.43 K/UL
MONOCYTES NFR BLD AUTO: 6.2 %
NEUTROPHILS # BLD AUTO: 3.92 K/UL
NEUTROPHILS NFR BLD AUTO: 56.7 %
PLATELET # BLD AUTO: 324 K/UL
RBC # BLD: 4.3 M/UL
RBC # FLD: 11.8 %
TSH SERPL-ACNC: 1.65 UIU/ML
VIT B12 SERPL-MCNC: 492 PG/ML
WBC # FLD AUTO: 6.91 K/UL

## 2022-04-13 NOTE — ASSESSMENT
[FreeTextEntry1] : Patient presented today with complaint of feeling excessive fatigue and tired for last 2 weeks and also has concern of gaining weight.\par She had no disturbance in sleep, not depressed.\par She has been taking multivitamins, vitamin D and vitamin B12 with no improvement in symptoms.\par Offered B12 injection patient refused.\par Order vitamin B6, B12, vitamin D as per patient request.\par Ordered CBC to rule out anemia and thyroid function test\par Recommended patient to start regular exercise, and eat low-carb diet.

## 2022-04-13 NOTE — REVIEW OF SYSTEMS
[Fever] : no fever [Chills] : no chills [Fatigue] : fatigue [Night Sweats] : no night sweats [Recent Change In Weight] : ~T recent weight change [Negative] : Neurological

## 2022-04-13 NOTE — HISTORY OF PRESENT ILLNESS
[FreeTextEntry8] : Ms. FLORY PERES presents today with a complaint of feeling excessive tiredness for last 2 weeks.\par Patient stated she gets good night sleep, do not snore at night, still sleepy and sleepy in the daytime.\par She also have concern of gaining weight, she said her diet has not changed though she has not been exercising lately.\par

## 2022-04-14 ENCOUNTER — TRANSCRIPTION ENCOUNTER (OUTPATIENT)
Age: 32
End: 2022-04-14

## 2022-04-19 NOTE — CHIEF COMPLAINT
[Annual Visit] : annual visit Advancement Flap (Single) Text: The defect edges were debeveled with a #15 scalpel blade.  Given the location of the defect and the proximity to free margins a single advancement flap was deemed most appropriate.  Using a sterile surgical marker, an appropriate advancement flap was drawn incorporating the defect and placing the expected incisions within the relaxed skin tension lines where possible.    The area thus outlined was incised deep to adipose tissue with a #15 scalpel blade.  The skin margins were undermined to an appropriate distance in all directions utilizing iris scissors.

## 2022-04-20 LAB — VIT B6 SERPL-MCNC: 12.9 UG/L

## 2022-05-09 ENCOUNTER — APPOINTMENT (OUTPATIENT)
Dept: OBGYN | Facility: CLINIC | Age: 32
End: 2022-05-09

## 2022-07-20 ENCOUNTER — APPOINTMENT (OUTPATIENT)
Dept: OBGYN | Facility: CLINIC | Age: 32
End: 2022-07-20

## 2022-07-20 VITALS
BODY MASS INDEX: 25.11 KG/M2 | SYSTOLIC BLOOD PRESSURE: 90 MMHG | DIASTOLIC BLOOD PRESSURE: 60 MMHG | HEIGHT: 61 IN | WEIGHT: 133 LBS

## 2022-07-20 DIAGNOSIS — Z20.2 CONTACT WITH AND (SUSPECTED) EXPOSURE TO INFECTIONS WITH A PREDOMINANTLY SEXUAL MODE OF TRANSMISSION: ICD-10-CM

## 2022-07-20 PROCEDURE — 99395 PREV VISIT EST AGE 18-39: CPT

## 2022-08-19 NOTE — DISCUSSION/SUMMARY
[FreeTextEntry1] : Health Maintenance:\par pap and HPV '21 neg. Repeat next year\par \par Breast cancer screening.\par Rx for mammo\par Due 8/22\par \par Addendum:\par Mammo 8/11/22 BIRAD 3 - right breast microcalcifications\par 1 year spot macgnification views needed\par

## 2022-08-19 NOTE — HISTORY OF PRESENT ILLNESS
[FreeTextEntry1] : 31 yo G0 with LMP 6/30 for new gyn visit\par \par Menstrual triad: 14 x 28 x 5-6\par OCP for contraception. \par \par Med:\par 2017 lower abdominal pain. CT abd suggested need for colonoscopy\par FH:\par mother breast cancer @42 [Mammogramdate] : 2 [PapSmeardate] : 10/21 [TextBox_31] : Pap and HPV negative [ColonoscopyDate] : s [TextBox_43] : in past

## 2022-09-23 ENCOUNTER — TRANSCRIPTION ENCOUNTER (OUTPATIENT)
Age: 32
End: 2022-09-23

## 2022-10-04 ENCOUNTER — APPOINTMENT (OUTPATIENT)
Dept: INTERNAL MEDICINE | Facility: CLINIC | Age: 32
End: 2022-10-04

## 2022-10-04 VITALS
TEMPERATURE: 97.8 F | BODY MASS INDEX: 25.3 KG/M2 | SYSTOLIC BLOOD PRESSURE: 105 MMHG | HEIGHT: 61 IN | OXYGEN SATURATION: 99 % | WEIGHT: 134 LBS | DIASTOLIC BLOOD PRESSURE: 70 MMHG | HEART RATE: 78 BPM | RESPIRATION RATE: 16 BRPM

## 2022-10-04 DIAGNOSIS — N76.0 ACUTE VAGINITIS: ICD-10-CM

## 2022-10-04 DIAGNOSIS — R30.0 DYSURIA: ICD-10-CM

## 2022-10-04 PROCEDURE — 99213 OFFICE O/P EST LOW 20 MIN: CPT | Mod: 25

## 2022-10-04 PROCEDURE — 81003 URINALYSIS AUTO W/O SCOPE: CPT | Mod: QW

## 2022-10-05 PROBLEM — N76.0 VAGINITIS: Status: RESOLVED | Noted: 2022-10-05 | Resolved: 2022-11-04

## 2022-10-05 NOTE — ASSESSMENT
[FreeTextEntry1] : urnie dip : +ve blood : large.\par trending her UA , she have chronic hematuria .\par seen by urology in the past.\par SEND FOR URINE CX.\par Advised to drink more water.\par maintain good hygiene.\par cranberry juice recommended as a prophylaxis.\par  \par Vaginitis:\par prescribed clotrimazole vaginal cream.\par she have appt. with gyn.\par

## 2022-10-05 NOTE — HISTORY OF PRESENT ILLNESS
[FreeTextEntry8] : Ms. PERES is here today c/o feeling of a pressure sensation in the lower abdomen.\par She denied any urinary symptoms , no burning , pain , or increased frequency of urination.\par she also c/o vaginal itching with no discharge.\par she have h/o chronic hematuria, seen urology 4 yrs ago and was told everything is fine.\par \par \par

## 2022-10-05 NOTE — PHYSICAL EXAM
[Normal] : no acute distress, well nourished, well developed and well-appearing [Soft] : abdomen soft [Non-distended] : non-distended [Normal Bowel Sounds] : normal bowel sounds [de-identified] : mild pressure in the lower abdomen , no tenderness.

## 2022-10-05 NOTE — REVIEW OF SYSTEMS
[Abdominal Pain] : abdominal pain [Nausea] : no nausea [Constipation] : no constipation [Diarrhea] : diarrhea [Vomiting] : no vomiting [Heartburn] : no heartburn [Dysuria] : no dysuria [Incontinence] : no incontinence [Nocturia] : no nocturia [Hematuria] : no hematuria [Frequency] : no frequency [Vaginal Discharge] : no vaginal discharge [Negative] : Constitutional

## 2022-10-06 ENCOUNTER — TRANSCRIPTION ENCOUNTER (OUTPATIENT)
Age: 32
End: 2022-10-06

## 2022-10-06 LAB — BACTERIA UR CULT: NORMAL

## 2022-10-12 ENCOUNTER — LABORATORY RESULT (OUTPATIENT)
Age: 32
End: 2022-10-12

## 2022-10-12 ENCOUNTER — APPOINTMENT (OUTPATIENT)
Dept: OBGYN | Facility: CLINIC | Age: 32
End: 2022-10-12

## 2022-10-12 VITALS
HEIGHT: 61 IN | DIASTOLIC BLOOD PRESSURE: 60 MMHG | BODY MASS INDEX: 25.3 KG/M2 | SYSTOLIC BLOOD PRESSURE: 106 MMHG | WEIGHT: 134 LBS

## 2022-10-12 DIAGNOSIS — N94.2 VAGINISMUS: ICD-10-CM

## 2022-10-12 DIAGNOSIS — Z12.4 ENCOUNTER FOR SCREENING FOR MALIGNANT NEOPLASM OF CERVIX: ICD-10-CM

## 2022-10-12 DIAGNOSIS — Z80.3 FAMILY HISTORY OF MALIGNANT NEOPLASM OF BREAST: ICD-10-CM

## 2022-10-12 LAB
BILIRUB UR QL STRIP: NORMAL
CLARITY UR: CLEAR
COLLECTION METHOD: NORMAL
GLUCOSE UR-MCNC: NORMAL
HCG UR QL: 0.2 EU/DL
HGB UR QL STRIP.AUTO: NORMAL
KETONES UR-MCNC: NORMAL
LEUKOCYTE ESTERASE UR QL STRIP: NORMAL
NITRITE UR QL STRIP: NORMAL
PH UR STRIP: 5.5
PROT UR STRIP-MCNC: NORMAL
SP GR UR STRIP: 1.01

## 2022-10-12 PROCEDURE — 99214 OFFICE O/P EST MOD 30 MIN: CPT

## 2022-10-12 PROCEDURE — 81003 URINALYSIS AUTO W/O SCOPE: CPT | Mod: QW

## 2022-10-12 NOTE — DISCUSSION/SUMMARY
[FreeTextEntry1] : Risks, benefits and alternatives to OCP discussed with patient including breast cancer, venous thromboembolism and benefits of reduced ovarian cancer. Patient will continue at this time.\par renew Ortho tri cyclen lo\par \par Chronic blood in urine:\par urine cx today\par some suprapubic pain\par suggest urologist follow up\par \par FH breast cancer\par mammo requested 8/23\par must go after hormones on OCP\par Not sure that annual mammo is best screening for this young woman.\par Will suggest breast surgery follow up\par \par Vaginismus:\par Vaginal floor PT\[facility]\par will need help prior to trying to conceive\par

## 2022-10-12 NOTE — HISTORY OF PRESENT ILLNESS
[FreeTextEntry1] : 31 yo G0 with LMP 6/30 for new gyn visit\par \par CC: contraception\par \par blood in urine\par \par pain with intercourse\par \par \par Menstrual triad: 14 x 28 x 5-6\par OCP for contraception. \par \par Med:\par 2017 lower abdominal pain. CT abd suggested need for colonoscopy\par FH:\par mother breast cancer @42\par repeat mammo 8/23

## 2022-10-12 NOTE — PHYSICAL EXAM
[Appropriately responsive] : appropriately responsive [Alert] : alert [No Acute Distress] : no acute distress [No Lymphadenopathy] : no lymphadenopathy [Soft] : soft [Non-tender] : non-tender [Non-distended] : non-distended [No HSM] : No HSM [No Lesions] : no lesions [No Mass] : no mass [Oriented x3] : oriented x3 [Examination Of The Breasts] : a normal appearance [No Masses] : no breast masses were palpable [Labia Majora] : normal [Labia Minora] : normal [Normal] : normal [Uterine Adnexae] : normal [FreeTextEntry4] : vaginismus

## 2022-10-13 ENCOUNTER — NON-APPOINTMENT (OUTPATIENT)
Age: 32
End: 2022-10-13

## 2022-10-13 LAB
APPEARANCE: CLEAR
BILIRUBIN URINE: NEGATIVE
BLOOD URINE: ABNORMAL
COLOR: NORMAL
GLUCOSE QUALITATIVE U: NEGATIVE
KETONES URINE: NEGATIVE
LEUKOCYTE ESTERASE URINE: NEGATIVE
NITRITE URINE: NEGATIVE
PH URINE: 6
PROTEIN URINE: NEGATIVE
SPECIFIC GRAVITY URINE: 1.01
UROBILINOGEN URINE: NORMAL

## 2022-10-17 LAB — CYTOLOGY CVX/VAG DOC THIN PREP: NORMAL

## 2022-10-22 ENCOUNTER — NON-APPOINTMENT (OUTPATIENT)
Age: 32
End: 2022-10-22

## 2022-11-06 ENCOUNTER — NON-APPOINTMENT (OUTPATIENT)
Age: 32
End: 2022-11-06

## 2022-11-16 ENCOUNTER — NON-APPOINTMENT (OUTPATIENT)
Age: 32
End: 2022-11-16

## 2022-12-11 ENCOUNTER — NON-APPOINTMENT (OUTPATIENT)
Age: 32
End: 2022-12-11

## 2022-12-29 ENCOUNTER — NON-APPOINTMENT (OUTPATIENT)
Age: 32
End: 2022-12-29

## 2023-01-03 ENCOUNTER — RESULT CHARGE (OUTPATIENT)
Age: 33
End: 2023-01-03

## 2023-01-03 ENCOUNTER — APPOINTMENT (OUTPATIENT)
Dept: INTERNAL MEDICINE | Facility: CLINIC | Age: 33
End: 2023-01-03
Payer: COMMERCIAL

## 2023-01-03 VITALS
OXYGEN SATURATION: 98 % | WEIGHT: 139 LBS | BODY MASS INDEX: 26.24 KG/M2 | RESPIRATION RATE: 16 BRPM | HEART RATE: 84 BPM | DIASTOLIC BLOOD PRESSURE: 70 MMHG | TEMPERATURE: 97.8 F | HEIGHT: 61 IN | SYSTOLIC BLOOD PRESSURE: 105 MMHG

## 2023-01-03 DIAGNOSIS — R10.2 PELVIC AND PERINEAL PAIN: ICD-10-CM

## 2023-01-03 PROCEDURE — 99213 OFFICE O/P EST LOW 20 MIN: CPT | Mod: 25

## 2023-01-03 PROCEDURE — 81003 URINALYSIS AUTO W/O SCOPE: CPT | Mod: QW

## 2023-01-04 NOTE — HISTORY OF PRESENT ILLNESS
[FreeTextEntry8] : Ms. PERES is here today presents with complaint of lower abdominal discomfort.  Patient stated last Wednesday when she was having a bowel movement she had severe abdominal cramp which got better after few minutes.  And since then she is feeling pressure in the lower abdomen.\par She has seen gastroenterology had colonoscopy which was normal.  She also mentioned that she did not had a bowel movement since last Wednesday she feels bloated.\par Denied any burning or increased frequency of urination.\par Seem to have chronic microscopic hematuria have seen urology in the past.\par No nausea vomiting or fever.  GYN work-up normal.\par \par

## 2023-01-04 NOTE — ASSESSMENT
[FreeTextEntry1] : Patient presented with discomfort in suprapubic area.\par Urine dip shows moderate blood positive.\par Urine culture sent.\par Referred to urogynecology.\par \par Constipation:\par Her symptoms are most likely correlates towards IBS.\par She have a follow-up appointment with gastroenterology.\par Metamucil recommended.\par

## 2023-01-05 ENCOUNTER — TRANSCRIPTION ENCOUNTER (OUTPATIENT)
Age: 33
End: 2023-01-05

## 2023-01-05 LAB — BACTERIA UR CULT: NORMAL

## 2023-04-07 ENCOUNTER — APPOINTMENT (OUTPATIENT)
Dept: OBGYN | Facility: CLINIC | Age: 33
End: 2023-04-07
Payer: COMMERCIAL

## 2023-04-07 VITALS
DIASTOLIC BLOOD PRESSURE: 60 MMHG | HEIGHT: 61 IN | BODY MASS INDEX: 26.24 KG/M2 | SYSTOLIC BLOOD PRESSURE: 100 MMHG | WEIGHT: 139 LBS

## 2023-04-07 DIAGNOSIS — R10.9 UNSPECIFIED ABDOMINAL PAIN: ICD-10-CM

## 2023-04-07 PROCEDURE — 99213 OFFICE O/P EST LOW 20 MIN: CPT

## 2023-04-07 NOTE — HISTORY OF PRESENT ILLNESS
[FreeTextEntry1] : 33 yo G0 with LMP 4/2/23 for new follow up visit\par \par CC: contraception\par \par pain with intercourse unchanged\par \par \par Menstrual triad: 14 x 28 x 5-6\par OCP for contraception since age 21 for contraception and dysmenorrhea(vomiting and missed work pain)\par \par Med:\par 2017 lower abdominal pain. CT abd suggested need for colonoscopy\par FH:\par mother breast cancer @42, recurred age 59 from recurrence, dying age 60.(BRCA neg mother)\par repeat mammo 8/23

## 2023-04-07 NOTE — DISCUSSION/SUMMARY
[FreeTextEntry1] : ocp\par \par dyspareunia\par pelvic PT renewed\par \par pelvic pain discussed\par [facility]

## 2023-07-12 ENCOUNTER — APPOINTMENT (OUTPATIENT)
Dept: INTERNAL MEDICINE | Facility: CLINIC | Age: 33
End: 2023-07-12

## 2023-10-03 ENCOUNTER — APPOINTMENT (OUTPATIENT)
Dept: INTERNAL MEDICINE | Facility: CLINIC | Age: 33
End: 2023-10-03

## 2023-10-05 ENCOUNTER — LABORATORY RESULT (OUTPATIENT)
Age: 33
End: 2023-10-05

## 2023-10-05 ENCOUNTER — APPOINTMENT (OUTPATIENT)
Dept: INTERNAL MEDICINE | Facility: CLINIC | Age: 33
End: 2023-10-05
Payer: COMMERCIAL

## 2023-10-05 VITALS
HEART RATE: 82 BPM | TEMPERATURE: 98 F | OXYGEN SATURATION: 99 % | BODY MASS INDEX: 24.73 KG/M2 | HEIGHT: 61 IN | SYSTOLIC BLOOD PRESSURE: 119 MMHG | RESPIRATION RATE: 16 BRPM | WEIGHT: 131 LBS | DIASTOLIC BLOOD PRESSURE: 50 MMHG

## 2023-10-05 DIAGNOSIS — N89.8 OTHER SPECIFIED NONINFLAMMATORY DISORDERS OF VAGINA: ICD-10-CM

## 2023-10-05 DIAGNOSIS — R10.2 PELVIC AND PERINEAL PAIN: ICD-10-CM

## 2023-10-05 DIAGNOSIS — R39.9 UNSPECIFIED SYMPTOMS AND SIGNS INVOLVING THE GENITOURINARY SYSTEM: ICD-10-CM

## 2023-10-05 DIAGNOSIS — B37.31 ACUTE CANDIDIASIS OF VULVA AND VAGINA: ICD-10-CM

## 2023-10-05 DIAGNOSIS — L98.9 DISORDER OF THE SKIN AND SUBCUTANEOUS TISSUE, UNSPECIFIED: ICD-10-CM

## 2023-10-05 DIAGNOSIS — R31.29 OTHER MICROSCOPIC HEMATURIA: ICD-10-CM

## 2023-10-05 LAB
BILIRUB UR QL STRIP: NORMAL
CLARITY UR: CLEAR
COLLECTION METHOD: NORMAL
GLUCOSE UR-MCNC: NORMAL
HCG UR QL: 0.2 EU/DL
HGB UR QL STRIP.AUTO: NORMAL
KETONES UR-MCNC: NORMAL
LEUKOCYTE ESTERASE UR QL STRIP: NORMAL
NITRITE UR QL STRIP: NORMAL
PH UR STRIP: 5.5
PROT UR STRIP-MCNC: NORMAL
SP GR UR STRIP: 1.02

## 2023-10-05 PROCEDURE — 81003 URINALYSIS AUTO W/O SCOPE: CPT | Mod: QW

## 2023-10-05 PROCEDURE — 99214 OFFICE O/P EST MOD 30 MIN: CPT | Mod: 25

## 2023-10-09 LAB
APPEARANCE: CLEAR
BILIRUBIN URINE: NEGATIVE
BLOOD URINE: ABNORMAL
C TRACH RRNA SPEC QL NAA+PROBE: NOT DETECTED
COLOR: YELLOW
GLUCOSE QUALITATIVE U: NEGATIVE MG/DL
KETONES URINE: NEGATIVE MG/DL
LEUKOCYTE ESTERASE URINE: NEGATIVE
N GONORRHOEA RRNA SPEC QL NAA+PROBE: NOT DETECTED
NITRITE URINE: NEGATIVE
PH URINE: 6
PROTEIN URINE: NEGATIVE MG/DL
SOURCE AMPLIFICATION: NORMAL
SOURCE AMPLIFICATION: NORMAL
SPECIFIC GRAVITY URINE: 1.02
T VAGINALIS RRNA SPEC QL NAA+PROBE: NOT DETECTED
UROBILINOGEN URINE: 0.2 MG/DL

## 2023-10-27 ENCOUNTER — APPOINTMENT (OUTPATIENT)
Dept: INTERNAL MEDICINE | Facility: CLINIC | Age: 33
End: 2023-10-27
Payer: COMMERCIAL

## 2023-10-27 VITALS
HEIGHT: 61 IN | RESPIRATION RATE: 16 BRPM | TEMPERATURE: 97.1 F | WEIGHT: 131 LBS | OXYGEN SATURATION: 97 % | BODY MASS INDEX: 24.73 KG/M2 | HEART RATE: 80 BPM | DIASTOLIC BLOOD PRESSURE: 63 MMHG | SYSTOLIC BLOOD PRESSURE: 116 MMHG

## 2023-10-27 DIAGNOSIS — M54.12 RADICULOPATHY, CERVICAL REGION: ICD-10-CM

## 2023-10-27 PROCEDURE — 99214 OFFICE O/P EST MOD 30 MIN: CPT

## 2023-11-10 ENCOUNTER — APPOINTMENT (OUTPATIENT)
Dept: INTERNAL MEDICINE | Facility: CLINIC | Age: 33
End: 2023-11-10
Payer: COMMERCIAL

## 2023-11-10 VITALS
RESPIRATION RATE: 16 BRPM | SYSTOLIC BLOOD PRESSURE: 108 MMHG | OXYGEN SATURATION: 98 % | HEIGHT: 61 IN | TEMPERATURE: 98.1 F | DIASTOLIC BLOOD PRESSURE: 72 MMHG | HEART RATE: 83 BPM | WEIGHT: 131 LBS | BODY MASS INDEX: 24.73 KG/M2

## 2023-11-10 PROCEDURE — 99213 OFFICE O/P EST LOW 20 MIN: CPT

## 2023-11-17 ENCOUNTER — APPOINTMENT (OUTPATIENT)
Dept: OBGYN | Facility: CLINIC | Age: 33
End: 2023-11-17

## 2023-11-20 ENCOUNTER — APPOINTMENT (OUTPATIENT)
Dept: ORTHOPEDIC SURGERY | Facility: CLINIC | Age: 33
End: 2023-11-20
Payer: COMMERCIAL

## 2023-11-20 VITALS
HEIGHT: 61 IN | SYSTOLIC BLOOD PRESSURE: 127 MMHG | BODY MASS INDEX: 24.73 KG/M2 | HEART RATE: 83 BPM | WEIGHT: 131 LBS | DIASTOLIC BLOOD PRESSURE: 87 MMHG

## 2023-11-20 DIAGNOSIS — M79.601 PAIN IN RIGHT ARM: ICD-10-CM

## 2023-11-20 PROCEDURE — 99203 OFFICE O/P NEW LOW 30 MIN: CPT

## 2023-12-16 ENCOUNTER — RX RENEWAL (OUTPATIENT)
Age: 33
End: 2023-12-16

## 2023-12-20 ENCOUNTER — APPOINTMENT (OUTPATIENT)
Dept: NEUROLOGY | Facility: CLINIC | Age: 33
End: 2023-12-20
Payer: COMMERCIAL

## 2023-12-20 ENCOUNTER — NON-APPOINTMENT (OUTPATIENT)
Age: 33
End: 2023-12-20

## 2023-12-20 VITALS
SYSTOLIC BLOOD PRESSURE: 118 MMHG | DIASTOLIC BLOOD PRESSURE: 80 MMHG | BODY MASS INDEX: 24.17 KG/M2 | WEIGHT: 128 LBS | HEIGHT: 61 IN | TEMPERATURE: 97.8 F | HEART RATE: 65 BPM

## 2023-12-20 DIAGNOSIS — G56.91 UNSPECIFIED MONONEUROPATHY OF RIGHT UPPER LIMB: ICD-10-CM

## 2023-12-20 DIAGNOSIS — M79.601 PAIN IN RIGHT ARM: ICD-10-CM

## 2023-12-20 DIAGNOSIS — R20.0 PAIN IN RIGHT ARM: ICD-10-CM

## 2023-12-20 PROCEDURE — 99204 OFFICE O/P NEW MOD 45 MIN: CPT

## 2023-12-20 RX ORDER — FLUCONAZOLE 150 MG/1
150 TABLET ORAL
Qty: 2 | Refills: 0 | Status: DISCONTINUED | COMMUNITY
Start: 2021-10-09 | End: 2023-12-20

## 2023-12-20 NOTE — REVIEW OF SYSTEMS
[As Noted in HPI] : as noted in HPI [Negative] : Heme/Lymph [Numbness] : numbness [Tingling] : tingling [de-identified] : Pain right forearm and right shoulder [FreeTextEntry9] : right wrist, forearm and right shoulder pain

## 2023-12-20 NOTE — HISTORY OF PRESENT ILLNESS
[FreeTextEntry1] : 33-year-old right-handed female presents today for evaluation of pain and numbness in the right arm that has been ongoing for 6-7 years.  Patient reports that it all started in 2018, she noted sudden onset shooting pain with tingling sensation in the right forearm and hand, around this time she was taking boxing classes, it persisted and started radiating upwards to the proximal arm, she consulted a neurologist at [facility] [place], had EMG/NCV studies in 2018, per the patient was unremarkable for nerve entrapment/neuropathy.  Patient reports that she attended some physical therapy without much relief, further on she was attempting to do some gardening, she twisted her right wrist, this escalated the pain tremendously, pain also got localized to right wrist.  She again attempted physical therapy and was tried on NSAIDs.  Patient reports that due to COVID pandemic, she was working from home and was less stressed, for almost 2-3 years she noted significant relief of symptoms.    Patient started returning back to her work after Pandemic (works as a  in [place]) since past year,  she has noted recurrence of symptoms.    Currently she complains of numbness in the right hand predominantly lateral 3 digits, it is constant, also has right wrist pain, that radiates to forearm and localized to mid arm, describes this pain as burning, she also complains of numbness and pain that radiates from the ulnar border of right hand to the elbow and along posterior axillary line to scapula and localizes to medial scapular border.  Patient reports that over the past 2 months, she has also started experiencing pain in the right shoulder, pain is localized to anterior shoulder groove she has pain while rotating the arm posteriorly.  Patient reports she sleeps on her right side, pain has become intense wakes her up from her sleep.  She denies focal motor weakness, she has no difficulty carrying or lifting with the right hand/arm but reports pain when lifting heavier objects.  She also admits that she used to carry a heavy bag on right shoulder -that may have contributed, she is now carrying a backpack.  Patient denies injury to the neck, she denies neck pain radiating to the arms, she denies bladder or bowel dysfunction, visual blurring, double vision, vertigo, tinnitus, headaches, paresthesias of other 3 extremities or gait ataxia.  Patient is not aware or does not recall having had any infection, injury or vaccination prior to onset of symptoms 6-7 years ago.

## 2023-12-20 NOTE — DISCUSSION/SUMMARY
[FreeTextEntry1] : 33-year-old RHF with c/o pain and numbness in the right arm that has been ongoing for 6-7 years. It started in 2018, as sudden onset shooting pain with tingling sensation in the right forearm and hand, around this time she was taking boxing classes, pain slowly radiated upwards to proximal arm, in due course of time, she started noticing pain and numbness in the right hand localizing to lateral 3 digits that radiated up along the ulnar border to right elbow and along posterior axillary line to the scapula and localizes to upper medial scapular border. Pt had reprieve from pain during the pandemic 2 years, when she worked from home, symptoms however started recurring once she went back to work full-time . Over the past 2 months, patient is also experiencing pain in the right shoulder, posterior rotation of the shoulder is painful and has constant burning sensation in right forearm. Patient had EMGs/NCV studies in 2018 that were unremarkable as per the patient, she has been tried on NSAIDs, Medrol Dosepak, muscle relaxants, PT and massage therapy without success.  # Chronic recurring pain paresthesias right arm / hand; DD: possibility of ulnar neuropathy, versus right brachial plexopathy affecting lower trunk given pain as predominant feature, unlikely cervical radiculopathy  # possible of Carpal Tunnel syndrome right side  # Right shoulder pain; possible shoulder pathology, I believe unrelated to above issue - may be a secondary phenomenon  -I had a detailed discussion with the patient, I have recommended specific physical therapy for the arm and hand. -I have also recommended MRI of right brachial plexus. - As discussed with the patient, the gold standard for diagnosing complex chronic issue would be EMG/NCV studies of right upper extremity, I have however advised her to obtain prior studies from [facility] [place] for comparison. - Patient may follow-up with Dr. Field for shoulder related issues

## 2023-12-20 NOTE — PHYSICAL EXAM
[General Appearance - Alert] : alert [General Appearance - In No Acute Distress] : in no acute distress [Oriented To Time, Place, And Person] : oriented to person, place, and time [Impaired Insight] : insight and judgment were intact [Affect] : the affect was normal [Person] : oriented to person [Place] : oriented to place [Time] : oriented to time [Registration Intact] : recent registration memory intact [Concentration Intact] : normal concentrating ability [Naming Objects] : no difficulty naming common objects [Repeating Phrases] : no difficulty repeating a phrase [Fluency] : fluency intact [Comprehension] : comprehension intact [Past History] : adequate knowledge of personal past history [Cranial Nerves Optic (II)] : visual acuity intact bilaterally,  visual fields full to confrontation, pupils equal round and reactive to light [Cranial Nerves Oculomotor (III)] : extraocular motion intact [Cranial Nerves Trigeminal (V)] : facial sensation intact symmetrically [Cranial Nerves Facial (VII)] : face symmetrical [Cranial Nerves Vestibulocochlear (VIII)] : hearing was intact bilaterally [Cranial Nerves Glossopharyngeal (IX)] : tongue and palate midline [Cranial Nerves Accessory (XI - Cranial And Spinal)] : head turning and shoulder shrug symmetric [Cranial Nerves Hypoglossal (XII)] : there was no tongue deviation with protrusion [Motor Tone] : muscle tone was normal in all four extremities [Motor Strength] : muscle strength was normal in all four extremities [No Muscle Atrophy] : normal bulk in all four extremities [Sensation Tactile Decrease] : light touch was intact [Abnormal Walk] : normal gait [Balance] : balance was intact [PERRL With Normal Accommodation] : pupils were equal in size, round, reactive to light, with normal accommodation [Extraocular Movements] : extraocular movements were intact [Full Visual Field] : full visual field [Outer Ear] : the ears and nose were normal in appearance [Hearing Threshold Finger Rub Not [place]] : hearing was normal [Neck Cervical Mass (___cm)] : no neck mass was observed [Heart Rate And Rhythm] : heart rate was normal and rhythm regular [Heart Sounds] : normal S1 and S2 [Arterial Pulses Carotid] : carotid pulses were normal with no bruits [Edema] : there was no peripheral edema [] : no rash [Pain / Temperature Decrease Middle Finger Only - Right Only] : diminished right middle finger (C7) [Pain / Temp Decr 4-5th Dig, Ulnar Hand, Dist Forearm Rt Only] : diminished right 4-5th digits and distal forearm (C8) [Pain / Temp Decrease Hand Ulnar 1-1/2 Digits] : diminished right ulnar 1-1/2 digits [Pain / Temp Decrease Ulnar Forearm And Arm (T1) - Right Only] : diminished right ulnar forearm and arm (T1) [Tremor] : no tremor present [Coordination - Dysmetria Impaired Finger-to-Nose Bilateral] : not present [2+] : Triceps left 2+ [1+] : Brachioradialis right 1+ [3+] : Patella left 3+ [Plantar Reflex Right Only] : normal on the right [Plantar Reflex Left Only] : normal on the left [FreeTextEntry6] : No scapular winging, shoulder slope equal, no significant weakness or atrophy except slightly weak right hand grasp [FreeTextEntry1] : Tenderness right medial scapular blade and Lev scapularis

## 2023-12-20 NOTE — CONSULT LETTER
[Dear  ___] : Dear  [unfilled], [Consult Letter:] : I had the pleasure of evaluating your patient, [unfilled]. [DrTia  ___] : Dr. IZQUIERDO

## 2023-12-20 NOTE — REASON FOR VISIT
[Consultation] : a consultation visit [FreeTextEntry1] : Referred by Dr. Field for evaluation of right arm numbness and pain associated with right shoulder pain

## 2024-01-04 ENCOUNTER — APPOINTMENT (OUTPATIENT)
Dept: MRI IMAGING | Facility: CLINIC | Age: 34
End: 2024-01-04

## 2024-01-09 ENCOUNTER — APPOINTMENT (OUTPATIENT)
Dept: ENDOCRINOLOGY | Facility: CLINIC | Age: 34
End: 2024-01-09

## 2024-01-16 ENCOUNTER — APPOINTMENT (OUTPATIENT)
Dept: OBGYN | Facility: CLINIC | Age: 34
End: 2024-01-16

## 2024-01-22 ENCOUNTER — APPOINTMENT (OUTPATIENT)
Dept: OBGYN | Facility: CLINIC | Age: 34
End: 2024-01-22
Payer: COMMERCIAL

## 2024-01-22 VITALS — DIASTOLIC BLOOD PRESSURE: 72 MMHG | BODY MASS INDEX: 24.56 KG/M2 | SYSTOLIC BLOOD PRESSURE: 118 MMHG | WEIGHT: 130 LBS

## 2024-01-22 DIAGNOSIS — N94.6 DYSMENORRHEA, UNSPECIFIED: ICD-10-CM

## 2024-01-22 DIAGNOSIS — Z12.39 ENCOUNTER FOR OTHER SCREENING FOR MALIGNANT NEOPLASM OF BREAST: ICD-10-CM

## 2024-01-22 DIAGNOSIS — Z30.9 ENCOUNTER FOR CONTRACEPTIVE MANAGEMENT, UNSPECIFIED: ICD-10-CM

## 2024-01-22 PROCEDURE — 99395 PREV VISIT EST AGE 18-39: CPT

## 2024-01-22 RX ORDER — DESOGESTREL AND ETHINYL ESTRADIOL 7 DAYS X 3
0.1/0.125/0.15 KIT ORAL DAILY
Qty: 3 | Refills: 1 | Status: ACTIVE | COMMUNITY
Start: 2019-04-15 | End: 1900-01-01

## 2024-01-22 RX ORDER — PREDNISONE 10 MG/1
10 TABLET ORAL DAILY
Qty: 5 | Refills: 0 | Status: COMPLETED | COMMUNITY
Start: 2023-10-27 | End: 2024-01-22

## 2024-01-22 RX ORDER — CLOTRIMAZOLE 10 MG/G
1 CREAM VAGINAL
Qty: 1 | Refills: 0 | Status: COMPLETED | COMMUNITY
Start: 2022-10-05 | End: 2024-01-22

## 2024-01-22 RX ORDER — MELOXICAM 15 MG/1
15 TABLET ORAL
Qty: 21 | Refills: 0 | Status: COMPLETED | COMMUNITY
Start: 2023-11-20 | End: 2024-01-22

## 2024-01-22 RX ORDER — GABAPENTIN 100 MG/1
100 CAPSULE ORAL
Qty: 14 | Refills: 0 | Status: COMPLETED | COMMUNITY
Start: 2023-11-12 | End: 2024-01-22

## 2024-01-22 RX ORDER — METHOCARBAMOL 500 MG/1
500 TABLET, FILM COATED ORAL
Qty: 7 | Refills: 0 | Status: COMPLETED | COMMUNITY
Start: 2023-10-27 | End: 2024-01-22

## 2024-01-22 NOTE — HISTORY OF PRESENT ILLNESS
[FreeTextEntry1] : 32 yo G0 with LMP 4/2/23 for new follow up visit  CC: contraception.  Unsure when she desires childbirth  CC: seeing therapist  CC: breast tenderness and lumpiness  Menstrual triad: 14 x 28 x 5-6 OCP for contraception since age 21 for contraception and dysmenorrhea (vomiting and missed work pain)  Med: 2017 lower abdominal pain. CT abd suggested need for colonoscopy  FH: mother breast cancer @42, recurred age 59 from recurrence, dying age 60.(BRCA neg mother despite being triple negative) Mammo 8/23 - BIRAD 3 for annual repeat M Aunt - breast cancer ~39 yo.  SH:  9/21,  [employer],  in [employer] [Mammogramdate] : 2023 [TextBox_19] : Mother breast cancer age 42, recurred 59.

## 2024-01-22 NOTE — DISCUSSION/SUMMARY
[FreeTextEntry1] : Health Maintenance: -Pap with HPV today -Diagnostic Mammo Rx given FH -TBSE -colonoscopy guidelines reviewed with patient -Achieve Vit D levels of 30-40, intake of 1100 mg daily calcium mostly thru dark green leafy greens and milk products, exercise 30 minutes TIW  Dyspareunia: s/p PT but only modest relief Dysmenorrhea better with OCPs, will renew Asking about future conception, pt requesting information about egg freezing. DAVID names provided  RTO 6 months

## 2024-01-24 LAB
CYTOLOGY CVX/VAG DOC THIN PREP: NORMAL
HPV HIGH+LOW RISK DNA PNL CVX: NOT DETECTED

## 2024-02-01 ENCOUNTER — APPOINTMENT (OUTPATIENT)
Dept: UROGYNECOLOGY | Facility: CLINIC | Age: 34
End: 2024-02-01
Payer: COMMERCIAL

## 2024-02-01 VITALS
SYSTOLIC BLOOD PRESSURE: 110 MMHG | DIASTOLIC BLOOD PRESSURE: 68 MMHG | HEIGHT: 62 IN | WEIGHT: 120 LBS | BODY MASS INDEX: 22.08 KG/M2

## 2024-02-01 DIAGNOSIS — N94.10 UNSPECIFIED DYSPAREUNIA: ICD-10-CM

## 2024-02-01 DIAGNOSIS — Z78.9 OTHER SPECIFIED HEALTH STATUS: ICD-10-CM

## 2024-02-01 DIAGNOSIS — R35.0 FREQUENCY OF MICTURITION: ICD-10-CM

## 2024-02-01 DIAGNOSIS — R31.9 HEMATURIA, UNSPECIFIED: ICD-10-CM

## 2024-02-01 LAB
BILIRUB UR QL STRIP: NEGATIVE
CLARITY UR: CLEAR
COLLECTION METHOD: NORMAL
GLUCOSE UR-MCNC: NEGATIVE
HCG UR QL: 0.2 EU/DL
HGB UR QL STRIP.AUTO: NORMAL
KETONES UR-MCNC: NEGATIVE
LEUKOCYTE ESTERASE UR QL STRIP: NEGATIVE
NITRITE UR QL STRIP: NEGATIVE
PH UR STRIP: 6
PROT UR STRIP-MCNC: NEGATIVE
SP GR UR STRIP: 1.02

## 2024-02-01 PROCEDURE — 81003 URINALYSIS AUTO W/O SCOPE: CPT | Mod: QW

## 2024-02-01 PROCEDURE — 99459 PELVIC EXAMINATION: CPT

## 2024-02-01 PROCEDURE — 99204 OFFICE O/P NEW MOD 45 MIN: CPT

## 2024-02-01 NOTE — PHYSICAL EXAM
[1] : 1 [Normal] : normal [Soft] :  the cervix was soft [Uterine Adnexae] : were not tender and not enlarged [Cough] : no cough [Tenderness] : ~T no ~M abdominal tenderness observed [Distended] : not distended [FreeTextEntry4] : levator spasm IC/PC/OI [de-identified] : no POP

## 2024-02-01 NOTE — DISCUSSION/SUMMARY
[FreeTextEntry1] : FLORY is a 33 year female who presents for , all less then 25 RBC however has pelvic pressure. On exam, negative CST, normal PVR, no POP.  Based on her exams and symptoms we discussed pelvic floor dysfunction with significant levator spasm. We discussed management options including relaxation techniques including stress reduction, avoiding pushing and straining, aveeno oatmeal baths 15 minutes twice daily, and management of constipation. We discussed the importance of physical therapy. We discussed risks and benefits of vaginal traumeel, she is aware she needs to go to a compounding pharmacy.     refused cath recommend cont PT u/a cx renal us/us pelvis  f/u 2 months  All questions answered.

## 2024-02-01 NOTE — OB HISTORY
[Vaginal ___] : [unfilled] vaginal delivery(s) [Definite ___ (Date)] : the last menstrual period was [unfilled] [Last Pap Smear ___] : date of last pap smear was on [unfilled] [Sexually Active] : sexually active [FreeTextEntry1] : HPV

## 2024-02-01 NOTE — ADDENDUM
[FreeTextEntry1] : This note was written by Tiffany Julian, acting as the  for Dr. Murphy. This note accurately reflects the work and decisions made by Dr. Murphy.

## 2024-02-01 NOTE — HISTORY OF PRESENT ILLNESS
[Cystocele (Obstetric)] : moderate [Uterine Prolapse] : no [Vaginal Wall Prolapse] : no [Urge Incontinence Of Urine] : no [Unable To Restrain Bowel Movement] : no [Feelings Of Urinary Urgency] : no [Urinary Stream Starts And Stops] : no [Constipation Obstructed Defecation] : no [Incomplete Emptying Of Stool] : no [] : no [Vaginal Pain] : no [FreeTextEntry1] : FLORY is a 33 year female who presents for hematuria. long history of MH, she reports occ pelvic pressure, she reports on the wkends she has urgency if she does not get up early, reports she occ has frequency/urgency if she has the urge to go to the bathroom, occ sexually active, no gross hematuria, she is going to PT  Ua with micro 10/2023 - 3 RBCs, occasional bacteria, calcium oxalate crystals present, small blood

## 2024-02-05 LAB
APPEARANCE: CLEAR
BACTERIA UR CULT: NORMAL
BACTERIA: NEGATIVE /HPF
BILIRUBIN URINE: NEGATIVE
BLOOD URINE: ABNORMAL
CAST: 0 /LPF
COLOR: YELLOW
EPITHELIAL CELLS: 4 /HPF
GLUCOSE QUALITATIVE U: NEGATIVE MG/DL
KETONES URINE: NEGATIVE MG/DL
LEUKOCYTE ESTERASE URINE: NEGATIVE
MICROSCOPIC-UA: NORMAL
NITRITE URINE: NEGATIVE
PH URINE: 5.5
PROTEIN URINE: NEGATIVE MG/DL
RED BLOOD CELLS URINE: 6 /HPF
SPECIFIC GRAVITY URINE: 1.02
UROBILINOGEN URINE: 0.2 MG/DL
WHITE BLOOD CELLS URINE: 4 /HPF

## 2024-02-22 ENCOUNTER — APPOINTMENT (OUTPATIENT)
Dept: HUMAN REPRODUCTION | Facility: CLINIC | Age: 34
End: 2024-02-22
Payer: COMMERCIAL

## 2024-02-22 PROCEDURE — 99204 OFFICE O/P NEW MOD 45 MIN: CPT

## 2024-02-22 PROCEDURE — 36415 COLL VENOUS BLD VENIPUNCTURE: CPT

## 2024-03-05 ENCOUNTER — APPOINTMENT (OUTPATIENT)
Dept: OBGYN | Facility: CLINIC | Age: 34
End: 2024-03-05

## 2024-03-12 ENCOUNTER — TRANSCRIPTION ENCOUNTER (OUTPATIENT)
Age: 34
End: 2024-03-12

## 2024-03-19 ENCOUNTER — APPOINTMENT (OUTPATIENT)
Dept: HUMAN REPRODUCTION | Facility: CLINIC | Age: 34
End: 2024-03-19
Payer: COMMERCIAL

## 2024-03-19 PROCEDURE — 99215 OFFICE O/P EST HI 40 MIN: CPT

## 2024-03-25 ENCOUNTER — APPOINTMENT (OUTPATIENT)
Dept: INTERNAL MEDICINE | Facility: CLINIC | Age: 34
End: 2024-03-25
Payer: COMMERCIAL

## 2024-03-25 VITALS
RESPIRATION RATE: 16 BRPM | OXYGEN SATURATION: 98 % | SYSTOLIC BLOOD PRESSURE: 105 MMHG | WEIGHT: 136 LBS | TEMPERATURE: 97.8 F | HEIGHT: 62 IN | HEART RATE: 75 BPM | BODY MASS INDEX: 25.03 KG/M2 | DIASTOLIC BLOOD PRESSURE: 78 MMHG

## 2024-03-25 DIAGNOSIS — M54.12 RADICULOPATHY, CERVICAL REGION: ICD-10-CM

## 2024-03-25 PROCEDURE — 99214 OFFICE O/P EST MOD 30 MIN: CPT

## 2024-03-25 PROCEDURE — G2211 COMPLEX E/M VISIT ADD ON: CPT

## 2024-04-08 ENCOUNTER — APPOINTMENT (OUTPATIENT)
Dept: UROGYNECOLOGY | Facility: CLINIC | Age: 34
End: 2024-04-08

## 2024-05-08 ENCOUNTER — TRANSCRIPTION ENCOUNTER (OUTPATIENT)
Age: 34
End: 2024-05-08

## 2024-05-19 ENCOUNTER — TRANSCRIPTION ENCOUNTER (OUTPATIENT)
Age: 34
End: 2024-05-19

## 2024-05-23 ENCOUNTER — TRANSCRIPTION ENCOUNTER (OUTPATIENT)
Age: 34
End: 2024-05-23

## 2024-05-29 ENCOUNTER — TRANSCRIPTION ENCOUNTER (OUTPATIENT)
Age: 34
End: 2024-05-29

## 2024-06-01 ENCOUNTER — APPOINTMENT (OUTPATIENT)
Dept: HUMAN REPRODUCTION | Facility: CLINIC | Age: 34
End: 2024-06-01
Payer: COMMERCIAL

## 2024-06-01 PROCEDURE — 76857 US EXAM PELVIC LIMITED: CPT

## 2024-06-01 PROCEDURE — 36415 COLL VENOUS BLD VENIPUNCTURE: CPT

## 2024-06-01 PROCEDURE — 99213 OFFICE O/P EST LOW 20 MIN: CPT | Mod: 25

## 2024-06-10 ENCOUNTER — APPOINTMENT (OUTPATIENT)
Dept: HUMAN REPRODUCTION | Facility: CLINIC | Age: 34
End: 2024-06-10
Payer: COMMERCIAL

## 2024-06-10 PROCEDURE — 99213 OFFICE O/P EST LOW 20 MIN: CPT

## 2024-06-18 RX ORDER — MELOXICAM 15 MG/1
15 TABLET ORAL
Qty: 30 | Refills: 0 | Status: ACTIVE | COMMUNITY
Start: 2024-05-19 | End: 1900-01-01

## 2024-06-19 ENCOUNTER — RX RENEWAL (OUTPATIENT)
Age: 34
End: 2024-06-19

## 2024-06-19 ENCOUNTER — APPOINTMENT (OUTPATIENT)
Dept: ORTHOPEDIC SURGERY | Facility: CLINIC | Age: 34
End: 2024-06-19
Payer: COMMERCIAL

## 2024-06-19 VITALS
WEIGHT: 136 LBS | DIASTOLIC BLOOD PRESSURE: 81 MMHG | SYSTOLIC BLOOD PRESSURE: 114 MMHG | HEART RATE: 64 BPM | BODY MASS INDEX: 25.03 KG/M2 | HEIGHT: 62 IN

## 2024-06-19 DIAGNOSIS — M25.511 PAIN IN RIGHT SHOULDER: ICD-10-CM

## 2024-06-19 PROCEDURE — 99213 OFFICE O/P EST LOW 20 MIN: CPT

## 2024-06-19 NOTE — PHYSICAL EXAM
[de-identified] : General: Awake, alert, no acute distress, Patient was cooperative and appropriate during the examination.  The patient is of normal weight for height and age.  Walks without an antalgic gait.  Right shoulder Exam: Physical exam of the shoulder demonstrates normal skin without signs of skin changes or abnormalities. No erythema, warmth, or joint effusion appreciated.     Sensation intact light touch C5-T1 Palpable radial pulse Radial/ulnar/median/axillary/musculocutaneous/AIN/PIN nerves grossly intact   Range of motion: Forward Flexion: 180 Abduction: 150 External Rotation: 60 Internal Rotation: T7   Palpation: Not tender to palpation over the glenohumeral joint Exquisitely tender palpation over the rotator cuff insertion on the greater tuberosity Not tender to palpation over the AC joint Not tender to palpation of the biceps tendon/bicipital groove Moderately tender palpation over the medial border of the scapula   Strength testing: Supraspinatus: 5-/5 Infraspinatus: 5/5 Subscapularis: 5/5   Special test: Empty can test positive Campos impingement test positive Speeds test negative [place]'s test negative Lift-off test negative Bell-press test negative Cross-arm adduction test positive Positive Tinel's at the wrist and elbow Positive elbow flexion test Positive Cris's compression test at the wrist   [de-identified] : MRI of the right shoulder taken at  radiology on June 12, 2024 revealed mild supraspinatus and infraspinatus tendinosis with associated low-grade partial-thickness articular sided tearing with trace subacromial/subdeltoid bursitis.  MRI of the cervical spine taken at  radiology in April 2024 shows no cervical pathology.

## 2024-06-19 NOTE — DISCUSSION/SUMMARY
[de-identified] : Assessment: 34-year-old female with right shoulder pain secondary to biceps and rotator cuff tendinitis, right hand numbness secondary to possible cubital/carpal tunnel syndrome versus radiculopathy  Plan: I had a long discussion with the patient today regarding the nature of their diagnosis and treatment plan. We discussed the risks and benefits of no treatment as well as nonoperative and operative treatments.   On examination today the patient has good shoulder range of motion and strength with tenderness palpation of the bicipital groove.  She also has reproducible numbness and tingling with Tinel's of the cubital and carpal tunnel.  At this time I am recommending continue conservative treatment occluding ice, heat, rest, Mobic 15 mg to be taken once daily with food for pain and inflammation, physical therapy for symptomatic relief.  GI precautions were discussed and a prescription for physical therapy was provided today.  She will avoid exacerbating activities and I also offered a right shoulder subacromial injection in the office today.  She would like to hold off on this injection because she is very nervous.  She can schedule follow-up whenever she desires if she would like to reconsider the injection.  She will otherwise follow-up in 8 weeks for repeat evaluation to reassess send symptoms were persistent by conservative treatment we did discuss potential injections versus surgical intervention.  Patient discussed and reviewed with Dr. Field today.   The patient verbalizes their understanding and agrees with the plan.  All questions were answered to their satisfaction.

## 2024-06-19 NOTE — HISTORY OF PRESENT ILLNESS
[de-identified] : 06/19/2024 : FLORY PERES  is a 34 year  old female who presents to the office for follow-up evaluation and follow-up of her right shoulder and scapular pain.  She states that since last office visit she did a couple months of physical therapy but every time she did physical therapy she had aggravation of her symptoms.  She states she has been taking intermittent meloxicam as per primary care and has been following up with her primary care who tried to get authorization for an MRI of her neck and shoulder.  She finally was able to get authorization for this and she is here to discuss the results with a specialist today for continued shoulder and scapular pain.  She states she still does get intermittent numbness and tingling and pain that radiates all the way into the fingers.  She has an EMG scheduled for July for evaluation of potential cervical radiculopathy versus carpal or cubital tunnel.  She denies any new injuries.  She has no other complaints today.  11/20/2023 : FOLRY PERES  is a 33 year  old female who presents to the office for evaluation of her shoulder and arm pain that has been going on for years.  She states that she had done physical therapy and medicine years ago which did not give any significant relief but eventually the symptoms seem to go away.  She states that since the summer without any new acute traumatic injury she has had worsening pain into the upper arm that radiates into the neck and down into the hand and fingers and she gets a burning sensation in her forearm and constant numbness and tingling into her fingers.  She states the burning pain waxes and wanes in severity and wakes her up from sleep and affects her activities of daily living.  She states that she has difficulty sleeping and laying down because of the pain in the arm and into the hand.  She states she had a previous EMG years ago that was negative for any nerve impingement and a treatment for her neck in the past that did not give any significant relief.  She is here to try and find answers.  She states recently she saw her primary care who gave her a Medrol Dosepak and muscle relaxer and recommended massage therapy which she did 2 sessions of which did not give any significant relief.  She is here for specialist opinion to discuss options.

## 2024-07-01 ENCOUNTER — APPOINTMENT (OUTPATIENT)
Dept: ORTHOPEDIC SURGERY | Facility: CLINIC | Age: 34
End: 2024-07-01
Payer: COMMERCIAL

## 2024-07-01 VITALS
DIASTOLIC BLOOD PRESSURE: 83 MMHG | SYSTOLIC BLOOD PRESSURE: 125 MMHG | WEIGHT: 128 LBS | HEIGHT: 62 IN | TEMPERATURE: 98.1 F | HEART RATE: 70 BPM | BODY MASS INDEX: 23.55 KG/M2

## 2024-07-01 PROBLEM — M25.511 CHRONIC RIGHT SHOULDER PAIN: Status: ACTIVE | Noted: 2023-11-20

## 2024-07-01 PROCEDURE — 20610 DRAIN/INJ JOINT/BURSA W/O US: CPT | Mod: RT

## 2024-07-01 PROCEDURE — 99214 OFFICE O/P EST MOD 30 MIN: CPT | Mod: 25

## 2024-07-09 ENCOUNTER — APPOINTMENT (OUTPATIENT)
Dept: HUMAN REPRODUCTION | Facility: CLINIC | Age: 34
End: 2024-07-09
Payer: COMMERCIAL

## 2024-07-09 ENCOUNTER — APPOINTMENT (OUTPATIENT)
Dept: HUMAN REPRODUCTION | Facility: CLINIC | Age: 34
End: 2024-07-09

## 2024-07-09 PROCEDURE — 82670 ASSAY OF TOTAL ESTRADIOL: CPT

## 2024-07-09 PROCEDURE — 84702 CHORIONIC GONADOTROPIN TEST: CPT

## 2024-07-09 PROCEDURE — 76857 US EXAM PELVIC LIMITED: CPT

## 2024-07-09 PROCEDURE — 83002 ASSAY OF GONADOTROPIN (LH): CPT | Mod: QW

## 2024-07-09 PROCEDURE — 84144 ASSAY OF PROGESTERONE: CPT

## 2024-07-09 PROCEDURE — 36415 COLL VENOUS BLD VENIPUNCTURE: CPT

## 2024-07-09 PROCEDURE — 83001 ASSAY OF GONADOTROPIN (FSH): CPT | Mod: QW

## 2024-07-09 PROCEDURE — 99213 OFFICE O/P EST LOW 20 MIN: CPT | Mod: 25

## 2024-07-11 ENCOUNTER — APPOINTMENT (OUTPATIENT)
Dept: NEUROLOGY | Facility: CLINIC | Age: 34
End: 2024-07-11
Payer: COMMERCIAL

## 2024-07-11 VITALS
DIASTOLIC BLOOD PRESSURE: 80 MMHG | HEART RATE: 82 BPM | WEIGHT: 128 LBS | HEIGHT: 62 IN | TEMPERATURE: 98.2 F | BODY MASS INDEX: 23.55 KG/M2 | SYSTOLIC BLOOD PRESSURE: 113 MMHG

## 2024-07-11 DIAGNOSIS — R20.0 PAIN IN RIGHT ARM: ICD-10-CM

## 2024-07-11 DIAGNOSIS — G89.29 PAIN IN RIGHT SHOULDER: ICD-10-CM

## 2024-07-11 DIAGNOSIS — G56.91 UNSPECIFIED MONONEUROPATHY OF RIGHT UPPER LIMB: ICD-10-CM

## 2024-07-11 DIAGNOSIS — M25.511 PAIN IN RIGHT SHOULDER: ICD-10-CM

## 2024-07-11 DIAGNOSIS — M79.601 PAIN IN RIGHT ARM: ICD-10-CM

## 2024-07-11 PROCEDURE — 95909 NRV CNDJ TST 5-6 STUDIES: CPT | Mod: 59

## 2024-07-11 PROCEDURE — 99212 OFFICE O/P EST SF 10 MIN: CPT | Mod: 25

## 2024-07-12 ENCOUNTER — APPOINTMENT (OUTPATIENT)
Dept: HUMAN REPRODUCTION | Facility: CLINIC | Age: 34
End: 2024-07-12
Payer: COMMERCIAL

## 2024-07-12 PROCEDURE — 76857 US EXAM PELVIC LIMITED: CPT

## 2024-07-12 PROCEDURE — 99213 OFFICE O/P EST LOW 20 MIN: CPT | Mod: 25

## 2024-07-12 PROCEDURE — 36415 COLL VENOUS BLD VENIPUNCTURE: CPT

## 2024-07-13 ENCOUNTER — APPOINTMENT (OUTPATIENT)
Dept: HUMAN REPRODUCTION | Facility: CLINIC | Age: 34
End: 2024-07-13
Payer: COMMERCIAL

## 2024-07-13 PROCEDURE — 76857 US EXAM PELVIC LIMITED: CPT

## 2024-07-13 PROCEDURE — 36415 COLL VENOUS BLD VENIPUNCTURE: CPT

## 2024-07-13 PROCEDURE — 99213 OFFICE O/P EST LOW 20 MIN: CPT | Mod: 25

## 2024-07-15 ENCOUNTER — APPOINTMENT (OUTPATIENT)
Dept: HUMAN REPRODUCTION | Facility: CLINIC | Age: 34
End: 2024-07-15
Payer: COMMERCIAL

## 2024-07-15 PROCEDURE — 99213 OFFICE O/P EST LOW 20 MIN: CPT | Mod: 25

## 2024-07-15 PROCEDURE — 36415 COLL VENOUS BLD VENIPUNCTURE: CPT

## 2024-07-15 PROCEDURE — 76857 US EXAM PELVIC LIMITED: CPT

## 2024-07-16 ENCOUNTER — APPOINTMENT (OUTPATIENT)
Dept: HUMAN REPRODUCTION | Facility: CLINIC | Age: 34
End: 2024-07-16

## 2024-07-16 ENCOUNTER — RX RENEWAL (OUTPATIENT)
Age: 34
End: 2024-07-16

## 2024-07-17 ENCOUNTER — APPOINTMENT (OUTPATIENT)
Dept: HUMAN REPRODUCTION | Facility: CLINIC | Age: 34
End: 2024-07-17

## 2024-07-17 ENCOUNTER — APPOINTMENT (OUTPATIENT)
Dept: HUMAN REPRODUCTION | Facility: CLINIC | Age: 34
End: 2024-07-17
Payer: COMMERCIAL

## 2024-07-17 PROCEDURE — 36415 COLL VENOUS BLD VENIPUNCTURE: CPT

## 2024-07-17 PROCEDURE — 84144 ASSAY OF PROGESTERONE: CPT

## 2024-07-17 PROCEDURE — 83002 ASSAY OF GONADOTROPIN (LH): CPT | Mod: QW

## 2024-07-17 PROCEDURE — 82670 ASSAY OF TOTAL ESTRADIOL: CPT

## 2024-07-17 PROCEDURE — 76857 US EXAM PELVIC LIMITED: CPT

## 2024-07-18 ENCOUNTER — APPOINTMENT (OUTPATIENT)
Dept: HUMAN REPRODUCTION | Facility: CLINIC | Age: 34
End: 2024-07-18
Payer: COMMERCIAL

## 2024-07-18 PROCEDURE — 99213 OFFICE O/P EST LOW 20 MIN: CPT | Mod: 25

## 2024-07-18 PROCEDURE — 76857 US EXAM PELVIC LIMITED: CPT

## 2024-07-18 PROCEDURE — 36415 COLL VENOUS BLD VENIPUNCTURE: CPT

## 2024-07-18 PROCEDURE — 99459 PELVIC EXAMINATION: CPT

## 2024-07-19 ENCOUNTER — APPOINTMENT (OUTPATIENT)
Dept: HUMAN REPRODUCTION | Facility: CLINIC | Age: 34
End: 2024-07-19
Payer: COMMERCIAL

## 2024-07-19 PROCEDURE — 99213 OFFICE O/P EST LOW 20 MIN: CPT | Mod: 25

## 2024-07-19 PROCEDURE — 36415 COLL VENOUS BLD VENIPUNCTURE: CPT

## 2024-07-19 PROCEDURE — 76857 US EXAM PELVIC LIMITED: CPT

## 2024-07-20 ENCOUNTER — APPOINTMENT (OUTPATIENT)
Dept: HUMAN REPRODUCTION | Facility: CLINIC | Age: 34
End: 2024-07-20
Payer: COMMERCIAL

## 2024-07-20 PROCEDURE — 36415 COLL VENOUS BLD VENIPUNCTURE: CPT

## 2024-07-21 PROCEDURE — 89337 CRYOPRESERVATION OOCYTE(S): CPT

## 2024-07-21 PROCEDURE — 89254 OOCYTE IDENTIFICATION: CPT

## 2024-07-21 PROCEDURE — 89346 STORAGE/YEAR OOCYTE(S): CPT

## 2024-07-22 ENCOUNTER — APPOINTMENT (OUTPATIENT)
Dept: HUMAN REPRODUCTION | Facility: CLINIC | Age: 34
End: 2024-07-22
Payer: COMMERCIAL

## 2024-07-23 ENCOUNTER — APPOINTMENT (OUTPATIENT)
Dept: HUMAN REPRODUCTION | Facility: CLINIC | Age: 34
End: 2024-07-23
Payer: COMMERCIAL

## 2024-07-23 PROCEDURE — 36415 COLL VENOUS BLD VENIPUNCTURE: CPT

## 2024-07-23 PROCEDURE — 76857 US EXAM PELVIC LIMITED: CPT

## 2024-07-23 PROCEDURE — 99214 OFFICE O/P EST MOD 30 MIN: CPT | Mod: 25

## 2024-07-25 ENCOUNTER — APPOINTMENT (OUTPATIENT)
Dept: HUMAN REPRODUCTION | Facility: CLINIC | Age: 34
End: 2024-07-25
Payer: COMMERCIAL

## 2024-07-25 PROCEDURE — 99459 PELVIC EXAMINATION: CPT

## 2024-07-25 PROCEDURE — 99214 OFFICE O/P EST MOD 30 MIN: CPT | Mod: 25

## 2024-07-25 PROCEDURE — 76857 US EXAM PELVIC LIMITED: CPT

## 2024-07-25 PROCEDURE — 36415 COLL VENOUS BLD VENIPUNCTURE: CPT

## 2024-07-29 ENCOUNTER — APPOINTMENT (OUTPATIENT)
Dept: HUMAN REPRODUCTION | Facility: CLINIC | Age: 34
End: 2024-07-29
Payer: COMMERCIAL

## 2024-07-29 PROCEDURE — 99213 OFFICE O/P EST LOW 20 MIN: CPT | Mod: 25

## 2024-07-29 PROCEDURE — 76857 US EXAM PELVIC LIMITED: CPT

## 2024-07-29 PROCEDURE — 36415 COLL VENOUS BLD VENIPUNCTURE: CPT

## 2024-08-01 ENCOUNTER — APPOINTMENT (OUTPATIENT)
Dept: RHEUMATOLOGY | Facility: CLINIC | Age: 34
End: 2024-08-01
Payer: COMMERCIAL

## 2024-08-01 ENCOUNTER — NON-APPOINTMENT (OUTPATIENT)
Age: 34
End: 2024-08-01

## 2024-08-01 ENCOUNTER — RX RENEWAL (OUTPATIENT)
Age: 34
End: 2024-08-01

## 2024-08-01 VITALS
RESPIRATION RATE: 17 BRPM | BODY MASS INDEX: 23.55 KG/M2 | DIASTOLIC BLOOD PRESSURE: 78 MMHG | HEIGHT: 62 IN | WEIGHT: 128 LBS | TEMPERATURE: 97.6 F | HEART RATE: 75 BPM | SYSTOLIC BLOOD PRESSURE: 110 MMHG | OXYGEN SATURATION: 98 %

## 2024-08-01 DIAGNOSIS — Z78.9 OTHER SPECIFIED HEALTH STATUS: ICD-10-CM

## 2024-08-01 DIAGNOSIS — M25.531 PAIN IN RIGHT WRIST: ICD-10-CM

## 2024-08-01 DIAGNOSIS — Z82.61 FAMILY HISTORY OF ARTHRITIS: ICD-10-CM

## 2024-08-01 DIAGNOSIS — R20.0 ANESTHESIA OF SKIN: ICD-10-CM

## 2024-08-01 PROCEDURE — 99205 OFFICE O/P NEW HI 60 MIN: CPT

## 2024-08-01 NOTE — PHYSICAL EXAM
[General Appearance - Alert] : alert [General Appearance - In No Acute Distress] : in no acute distress [Sclera] : the sclera and conjunctiva were normal [Outer Ear] : the ears and nose were normal in appearance [Oropharynx] : the oropharynx was normal [Neck Appearance] : the appearance of the neck was normal [Neck Cervical Mass (___cm)] : no neck mass was observed [Jugular Venous Distention Increased] : there was no jugular-venous distention [Thyroid Diffuse Enlargement] : the thyroid was not enlarged [Thyroid Nodule] : there were no palpable thyroid nodules [Auscultation Breath Sounds / Voice Sounds] : lungs were clear to auscultation bilaterally [Heart Rate And Rhythm] : heart rate was normal and rhythm regular [Heart Sounds] : normal S1 and S2 [Heart Sounds Gallop] : no gallops [Murmurs] : no murmurs [Heart Sounds Pericardial Friction Rub] : no pericardial rub [Edema] : there was no peripheral edema [Bowel Sounds] : normal bowel sounds [Abdomen Soft] : soft [Abdomen Tenderness] : non-tender [Abdomen Mass (___ Cm)] : no abdominal mass palpated [Cervical Lymph Nodes Enlarged Posterior Bilaterally] : posterior cervical [Cervical Lymph Nodes Enlarged Anterior Bilaterally] : anterior cervical [Supraclavicular Lymph Nodes Enlarged Bilaterally] : supraclavicular [No Spinal Tenderness] : no spinal tenderness [Skin Color & Pigmentation] : normal skin color and pigmentation [Skin Turgor] : normal skin turgor [] : no rash [No Focal Deficits] : no focal deficits [Oriented To Time, Place, And Person] : oriented to person, place, and time [Impaired Insight] : insight and judgment were intact [Affect] : the affect was normal [FreeTextEntry1] : No synovitis;  right wrist w/ (+)tenderness, pain upon flexion/extension, (+)Phalen's sign;  right elbow w/ pain over lateral epicondylem no significant pain upon resisted extension of wrist;  right shoulder w/ pain upon abduction and internal rotation, (+)impingement;  normal ROM in rest of joints

## 2024-08-01 NOTE — DATA REVIEWED
[FreeTextEntry1] : MRI right shoulder (6/12/2024):  Mild supraspinatus and infraspinatus tendinosis with associated low-grade partial-thickness articular surface tear.  No fracture.  Trace subacromial/subdeltoid bursitis. MRI C-spine (4/25/2024):  unremarkable

## 2024-08-01 NOTE — HISTORY OF PRESENT ILLNESS
[Arthralgias] : arthralgias [FreeTextEntry1] : 34 year old female with PMHx as listed below presents with: - Right shoulder pain x >1 year.  Constant, worse at rest.  Throbbing usually 5/10 up to 7/10 at times.  No radiation.  No swelling or erythema.  Has been treated w/ PT and a corticosteroid injection, but no improvement.  She gets transient relief from meloxicam. - Right wrist pain radiating up to the elbow since 2020:  intermittent, primarily w/ activity.  up to 7/10.  No swelling or erythema.  Also w/ numbness/tingling in her right hand and RUE. She has also seen a neurologist and w/u including an EMG was normal. No F/C, no unintentional weight loss, no night sweats, no oral ulcers, no rashes, no joint pains, no alopecia, no photosensitivity, no dry eyes/dry mouth, no Raynaud symptoms, no focal weakness, no dysphagia  [Anorexia] : no anorexia [Weight Loss] : no weight loss [Malaise] : no malaise [Fever] : no fever [Chills] : no chills [Fatigue] : no fatigue [Malar Facial Rash] : no malar facial rash [Skin Lesions] : no lesions [Skin Nodules] : no skin nodules [Cough] : no cough [Dry Mouth] : no dry mouth [Dysphonia] : no dysphonia [Dysphagia] : no dysphagia [Shortness of Breath] : no shortness of breath [Chest Pain] : no chest pain [Joint Swelling] : no joint swelling [Joint Warmth] : no joint warmth [Joint Deformity] : no joint deformity [Decreased ROM] : no decreased range of motion [Morning Stiffness] : no morning stiffness [Falls] : no falls [Difficulty Standing] : no difficulty standing [Difficulty Walking] : no difficulty walking [Dyspnea] : no dyspnea [Myalgias] : no myalgias [Muscle Weakness] : no muscle weakness [Muscle Spasms] : no muscle spasms [Muscle Cramping] : no muscle cramping [Visual Changes] : no visual changes [Eye Pain] : no eye pain [Eye Redness] : no eye redness [Dry Eyes] : no dry eyes

## 2024-08-01 NOTE — ASSESSMENT
[FreeTextEntry1] : 34 year old female presents with several musculoskeletal complaints.  She does not exhibit any obvious signs/symptoms of an underlying connective tissue disorder: !)  Right shoulder pain:  recent MRI revealed a partial thickness rotator cuff tear with associated tendinosis and mild subacromial/subdeltoid bursitis.   - Recommend PT / exercise   - Continue meloxicam prn   - Topical analgesics prn   - Heating pads prn 2)  Right elbow pain radiating toward her hand:  most c/w lateral epicondylitis   - Minimize activities placing stress on the elbow   - ibuprofen prn   - ice packs   - tennis elbow band 3)  Right wrist pain:  exam suggestive of CTS, though recent EMG was negative.  Pt also w/ pain over the medial and lateral aspects of her wrist of unclear etiology.   - Rx wrist splint qhs   - x-rays of wrists   - Analgesia prn as above

## 2024-08-01 NOTE — CONSULT LETTER
[Dear  ___] : Dear  [unfilled], [Consult Letter:] : I had the pleasure of evaluating your patient, [unfilled]. [Please see my note below.] : Please see my note below. [Consult Closing:] : Thank you very much for allowing me to participate in the care of this patient.  If you have any questions, please do not hesitate to contact me. [Sincerely,] : Sincerely, [FreeTextEntry3] : Maxwell Bauman MD Rheumatology   of Medicine [place] [facility] at [address] [place] [address]  phone:  [phone] fax:      [phone]

## 2024-08-01 NOTE — CONSULT LETTER
[Dear  ___] : Dear  [unfilled], [Consult Letter:] : I had the pleasure of evaluating your patient, [unfilled]. [Please see my note below.] : Please see my note below. [Consult Closing:] : Thank you very much for allowing me to participate in the care of this patient.  If you have any questions, please do not hesitate to contact me. [Sincerely,] : Sincerely, [FreeTextEntry3] : Maxwell Bauman MD Rheumatology [facility]  of Medicine [place] [facility] at [address] [place] [address]  phone:  [phone] fax:      [phone]

## 2024-08-02 ENCOUNTER — APPOINTMENT (OUTPATIENT)
Dept: ORTHOPEDIC SURGERY | Facility: CLINIC | Age: 34
End: 2024-08-02
Payer: COMMERCIAL

## 2024-08-02 DIAGNOSIS — M75.81 OTHER SHOULDER LESIONS, RIGHT SHOULDER: ICD-10-CM

## 2024-08-02 DIAGNOSIS — M25.511 PAIN IN RIGHT SHOULDER: ICD-10-CM

## 2024-08-02 DIAGNOSIS — M75.21 BICIPITAL TENDINITIS, RIGHT SHOULDER: ICD-10-CM

## 2024-08-02 PROCEDURE — 99204 OFFICE O/P NEW MOD 45 MIN: CPT

## 2024-08-02 RX ORDER — CYCLOBENZAPRINE HYDROCHLORIDE 5 MG/1
5 TABLET, FILM COATED ORAL
Qty: 30 | Refills: 0 | Status: ACTIVE | COMMUNITY
Start: 2024-08-02 | End: 1900-01-01

## 2024-08-02 RX ORDER — MELOXICAM 15 MG/1
15 TABLET ORAL DAILY
Qty: 30 | Refills: 2 | Status: ACTIVE | COMMUNITY
Start: 2024-08-02 | End: 1900-01-01

## 2024-08-02 NOTE — IMAGING
[de-identified] : (Exam: Shoulder)   Laterality is right    Patient is in no acute distress, alert and oriented Sensation is grossly intact to light touch in the hand Motor function is 5/5 in the hand Capillary refill is less than 2 seconds in the fingers Lymphadenopathy is not present Peripheral edema is not present   Skin is intact Swelling is not present Atrophy is not present Scapular winging is not present Deformity of the AC joint is not present Deformity of the biceps is not present   Bicipital groove tenderness is present AC joint tenderness is not present Scapulothoracic tenderness is present   Active forward elevation is 160 Passive forward elevation is 175 External rotation at the side is 80 Internal rotation behind the back is to the level of T7   Forward elevation strength is 5-/5 External rotation strength at the side is 5/5 Internal rotation strength at the side is 5/5 Deltoid strength of anterior, posterior and lateral heads is 5/5   Campos test is abnormal OBriens test is normal Empty can test is abnormal Speeds test is normal Cross body adduction test is normal Belly press test is normal Apprehension and relocation is normal Sulcus sign is normal

## 2024-08-02 NOTE — DATA REVIEWED
[MRI] : MRI [Right] : of the right [Shoulder] : shoulder [I independently reviewed and interpreted images and report] : I independently reviewed and interpreted images and report [FreeTextEntry1] : low grade partial tear supraspinatus, superior labral fraying, proximal biceps tendonitis

## 2024-08-02 NOTE — HISTORY OF PRESENT ILLNESS
[de-identified] :  Date of initial evaluation 08/02/2024 Patient age is 34 years Occupation is   Body part causing symptoms is the right shoulder Symptoms began about a year ago, no injury  Location of pain is lateral, superior, posterior Quality of pain is throbbing  Pain score at rest is 5 Pain score during activity is 5 Radicular symptoms are not present Prior treatments include subacromial CSI with Dr Field, PT, meloxicam She had negative cervical spine workup with MRI and EMG Patient's condition is not associated with workers compensation, no-fault or interscholastic athletics

## 2024-08-02 NOTE — DISCUSSION/SUMMARY
[de-identified] : History, clinical examination and imaging were most consistent with: -right shoulder periscapular pain, rotator cuff and proximal biceps tendonitis   The diagnosis was explained in detail. The potential non-surgical and surgical treatments were reviewed. The relative risks and benefits of each option were considered relative to the patients age, activity level, medical history, symptom severity and previously attempted treatments.   The patient was advised to consult with their primary medical provider prior to initiation of any new medications to reduce the risk of adverse effects specific to their long-term home medications and medical history. The risk of gastrointestinal irritation and kidney injury specific to long-term NSAID use was discussed.   -Discussed the complex nature of her condition, given the chronicity of symptoms and failure to improve with conservative treatment. -At this time, recommend additional non-surgical treatment to address her periscapular pain. -Patient will resume formal PT. -Meloxicam prescribed for pain and inflammation, take as needed. -Flexeril prescribed for muscle spasms and tightness, take as needed. -Referral provided to Dr. Dmitriy Egan to discuss trigger point injections. -Discussed that if she had persistent symptoms would consider arthroscopic shoulder surgery. Would also consider referral for scapulothoracic bursectomy if periscapular symptoms persisted and had temporary improvement with trigger point injections. -Follow up in 8 weeks.     (MDM)   Problem Complexity -Moderate: chronic illness with exacerbation   Risk -Moderate: prescription medication

## 2024-08-09 ENCOUNTER — APPOINTMENT (OUTPATIENT)
Dept: HUMAN REPRODUCTION | Facility: CLINIC | Age: 34
End: 2024-08-09

## 2024-08-09 PROCEDURE — 76857 US EXAM PELVIC LIMITED: CPT

## 2024-08-09 PROCEDURE — 99213 OFFICE O/P EST LOW 20 MIN: CPT | Mod: 25

## 2024-08-12 ENCOUNTER — TRANSCRIPTION ENCOUNTER (OUTPATIENT)
Age: 34
End: 2024-08-12

## 2024-08-27 ENCOUNTER — NON-APPOINTMENT (OUTPATIENT)
Age: 34
End: 2024-08-27

## 2024-08-29 ENCOUNTER — APPOINTMENT (OUTPATIENT)
Dept: INTERNAL MEDICINE | Facility: CLINIC | Age: 34
End: 2024-08-29

## 2024-08-29 ENCOUNTER — APPOINTMENT (OUTPATIENT)
Dept: FAMILY MEDICINE | Facility: CLINIC | Age: 34
End: 2024-08-29

## 2024-08-29 VITALS
TEMPERATURE: 98 F | HEART RATE: 88 BPM | WEIGHT: 131 LBS | RESPIRATION RATE: 15 BRPM | DIASTOLIC BLOOD PRESSURE: 66 MMHG | HEIGHT: 62 IN | BODY MASS INDEX: 24.11 KG/M2 | SYSTOLIC BLOOD PRESSURE: 102 MMHG | OXYGEN SATURATION: 99 %

## 2024-08-29 DIAGNOSIS — J03.90 ACUTE TONSILLITIS, UNSPECIFIED: ICD-10-CM

## 2024-08-29 PROCEDURE — 99203 OFFICE O/P NEW LOW 30 MIN: CPT

## 2024-08-29 RX ORDER — AZITHROMYCIN 250 MG/1
250 TABLET, FILM COATED ORAL
Qty: 1 | Refills: 0 | Status: ACTIVE | COMMUNITY
Start: 2024-08-29 | End: 1900-01-01

## 2024-09-04 NOTE — REVIEW OF SYSTEMS
[Nasal Discharge] : nasal discharge [Sore Throat] : sore throat [Postnasal Drip] : postnasal drip [Negative] : Heme/Lymph [FreeTextEntry4] : congestion

## 2024-09-04 NOTE — PHYSICAL EXAM
[No Acute Distress] : no acute distress [Well Nourished] : well nourished [Well Developed] : well developed [Well-Appearing] : well-appearing [Normal Sclera/Conjunctiva] : normal sclera/conjunctiva [PERRL] : pupils equal round and reactive to light [EOMI] : extraocular movements intact [Normal Outer Ear/Nose] : the outer ears and nose were normal in appearance [No JVD] : no jugular venous distention [No Lymphadenopathy] : no lymphadenopathy [Supple] : supple [Thyroid Normal, No Nodules] : the thyroid was normal and there were no nodules present [No Respiratory Distress] : no respiratory distress  [No Accessory Muscle Use] : no accessory muscle use [Clear to Auscultation] : lungs were clear to auscultation bilaterally [Normal Rate] : normal rate  [Regular Rhythm] : with a regular rhythm [Normal S1, S2] : normal S1 and S2 [No Murmur] : no murmur heard [No Carotid Bruits] : no carotid bruits [No Abdominal Bruit] : a ~M bruit was not heard ~T in the abdomen [No Varicosities] : no varicosities [Pedal Pulses Present] : the pedal pulses are present [No Edema] : there was no peripheral edema [No Palpable Aorta] : no palpable aorta [No Extremity Clubbing/Cyanosis] : no extremity clubbing/cyanosis [Soft] : abdomen soft [Non Tender] : non-tender [Non-distended] : non-distended [No Masses] : no abdominal mass palpated [No HSM] : no HSM [Normal Bowel Sounds] : normal bowel sounds [Normal Posterior Cervical Nodes] : no posterior cervical lymphadenopathy [Normal Anterior Cervical Nodes] : no anterior cervical lymphadenopathy [No CVA Tenderness] : no CVA  tenderness [No Spinal Tenderness] : no spinal tenderness [No Joint Swelling] : no joint swelling [Grossly Normal Strength/Tone] : grossly normal strength/tone [No Rash] : no rash [Coordination Grossly Intact] : coordination grossly intact [No Focal Deficits] : no focal deficits [Normal Gait] : normal gait [Deep Tendon Reflexes (DTR)] : deep tendon reflexes were 2+ and symmetric [Normal Affect] : the affect was normal [Normal Insight/Judgement] : insight and judgment were intact [de-identified] : tonsil swollen and mildy erythematous.

## 2024-09-04 NOTE — PHYSICAL EXAM
[No Acute Distress] : no acute distress [Well Nourished] : well nourished [Well Developed] : well developed [Well-Appearing] : well-appearing [Normal Sclera/Conjunctiva] : normal sclera/conjunctiva [PERRL] : pupils equal round and reactive to light [EOMI] : extraocular movements intact [Normal Outer Ear/Nose] : the outer ears and nose were normal in appearance [No JVD] : no jugular venous distention [No Lymphadenopathy] : no lymphadenopathy [Supple] : supple [Thyroid Normal, No Nodules] : the thyroid was normal and there were no nodules present [No Respiratory Distress] : no respiratory distress  [No Accessory Muscle Use] : no accessory muscle use [Clear to Auscultation] : lungs were clear to auscultation bilaterally [Normal Rate] : normal rate  [Regular Rhythm] : with a regular rhythm [Normal S1, S2] : normal S1 and S2 [No Murmur] : no murmur heard [No Carotid Bruits] : no carotid bruits [No Abdominal Bruit] : a ~M bruit was not heard ~T in the abdomen [No Varicosities] : no varicosities [Pedal Pulses Present] : the pedal pulses are present [No Edema] : there was no peripheral edema [No Palpable Aorta] : no palpable aorta [No Extremity Clubbing/Cyanosis] : no extremity clubbing/cyanosis [Soft] : abdomen soft [Non Tender] : non-tender [Non-distended] : non-distended [No Masses] : no abdominal mass palpated [No HSM] : no HSM [Normal Bowel Sounds] : normal bowel sounds [Normal Posterior Cervical Nodes] : no posterior cervical lymphadenopathy [Normal Anterior Cervical Nodes] : no anterior cervical lymphadenopathy [No CVA Tenderness] : no CVA  tenderness [No Spinal Tenderness] : no spinal tenderness [No Joint Swelling] : no joint swelling [Grossly Normal Strength/Tone] : grossly normal strength/tone [No Rash] : no rash [Coordination Grossly Intact] : coordination grossly intact [No Focal Deficits] : no focal deficits [Normal Gait] : normal gait [Deep Tendon Reflexes (DTR)] : deep tendon reflexes were 2+ and symmetric [Normal Affect] : the affect was normal [Normal Insight/Judgement] : insight and judgment were intact [de-identified] : tonsil swollen and mildy erythematous.

## 2024-09-05 ENCOUNTER — APPOINTMENT (OUTPATIENT)
Dept: RHEUMATOLOGY | Facility: CLINIC | Age: 34
End: 2024-09-05

## 2024-10-02 ENCOUNTER — APPOINTMENT (OUTPATIENT)
Dept: RHEUMATOLOGY | Facility: CLINIC | Age: 34
End: 2024-10-02

## 2024-10-07 ENCOUNTER — APPOINTMENT (OUTPATIENT)
Dept: OBGYN | Facility: CLINIC | Age: 34
End: 2024-10-07

## 2024-10-28 ENCOUNTER — APPOINTMENT (OUTPATIENT)
Dept: FAMILY MEDICINE | Facility: CLINIC | Age: 34
End: 2024-10-28

## 2024-12-20 ENCOUNTER — APPOINTMENT (OUTPATIENT)
Dept: OBGYN | Facility: CLINIC | Age: 34
End: 2024-12-20

## 2025-01-24 ENCOUNTER — APPOINTMENT (OUTPATIENT)
Dept: OBGYN | Facility: CLINIC | Age: 35
End: 2025-01-24
Payer: COMMERCIAL

## 2025-01-24 VITALS
HEIGHT: 62 IN | BODY MASS INDEX: 24.48 KG/M2 | DIASTOLIC BLOOD PRESSURE: 60 MMHG | SYSTOLIC BLOOD PRESSURE: 106 MMHG | WEIGHT: 133 LBS

## 2025-01-24 DIAGNOSIS — N94.6 DYSMENORRHEA, UNSPECIFIED: ICD-10-CM

## 2025-01-24 DIAGNOSIS — Z31.69 ENCOUNTER FOR OTHER GENERAL COUNSELING AND ADVICE ON PROCREATION: ICD-10-CM

## 2025-01-24 DIAGNOSIS — R92.30 DENSE BREASTS, UNSPECIFIED: ICD-10-CM

## 2025-01-24 DIAGNOSIS — Z12.39 ENCOUNTER FOR OTHER SCREENING FOR MALIGNANT NEOPLASM OF BREAST: ICD-10-CM

## 2025-01-24 DIAGNOSIS — Z01.411 ENCOUNTER FOR GYNECOLOGICAL EXAMINATION (GENERAL) (ROUTINE) WITH ABNORMAL FINDINGS: ICD-10-CM

## 2025-01-24 PROCEDURE — 99395 PREV VISIT EST AGE 18-39: CPT

## 2025-01-25 ENCOUNTER — TRANSCRIPTION ENCOUNTER (OUTPATIENT)
Age: 35
End: 2025-01-25

## 2025-01-25 ENCOUNTER — NON-APPOINTMENT (OUTPATIENT)
Age: 35
End: 2025-01-25

## 2025-02-03 ENCOUNTER — APPOINTMENT (OUTPATIENT)
Dept: HUMAN REPRODUCTION | Facility: CLINIC | Age: 35
End: 2025-02-03
Payer: COMMERCIAL

## 2025-02-03 PROCEDURE — 99213 OFFICE O/P EST LOW 20 MIN: CPT | Mod: 95

## 2025-02-05 ENCOUNTER — NON-APPOINTMENT (OUTPATIENT)
Age: 35
End: 2025-02-05

## 2025-02-11 ENCOUNTER — APPOINTMENT (OUTPATIENT)
Dept: INTERNAL MEDICINE | Facility: CLINIC | Age: 35
End: 2025-02-11
Payer: COMMERCIAL

## 2025-02-11 VITALS
DIASTOLIC BLOOD PRESSURE: 70 MMHG | OXYGEN SATURATION: 97 % | RESPIRATION RATE: 16 BRPM | HEART RATE: 81 BPM | HEIGHT: 62 IN | TEMPERATURE: 97.9 F | BODY MASS INDEX: 24.48 KG/M2 | WEIGHT: 133 LBS | SYSTOLIC BLOOD PRESSURE: 120 MMHG

## 2025-02-11 DIAGNOSIS — R07.89 OTHER CHEST PAIN: ICD-10-CM

## 2025-02-11 PROCEDURE — G2211 COMPLEX E/M VISIT ADD ON: CPT | Mod: NC

## 2025-02-11 PROCEDURE — 99213 OFFICE O/P EST LOW 20 MIN: CPT

## 2025-04-04 ENCOUNTER — APPOINTMENT (OUTPATIENT)
Dept: OBGYN | Facility: CLINIC | Age: 35
End: 2025-04-04
Payer: COMMERCIAL

## 2025-04-04 VITALS
HEIGHT: 62 IN | BODY MASS INDEX: 24.11 KG/M2 | SYSTOLIC BLOOD PRESSURE: 116 MMHG | DIASTOLIC BLOOD PRESSURE: 68 MMHG | WEIGHT: 131 LBS

## 2025-04-04 DIAGNOSIS — N63.10 UNSPECIFIED LUMP IN THE RIGHT BREAST, UNSPECIFIED QUADRANT: ICD-10-CM

## 2025-04-04 DIAGNOSIS — N63.11 UNSPECIFIED LUMP IN THE RIGHT BREAST, UPPER OUTER QUADRANT: ICD-10-CM

## 2025-04-04 PROCEDURE — 99213 OFFICE O/P EST LOW 20 MIN: CPT

## 2025-04-07 ENCOUNTER — APPOINTMENT (OUTPATIENT)
Dept: ULTRASOUND IMAGING | Facility: CLINIC | Age: 35
End: 2025-04-07

## 2025-04-07 ENCOUNTER — APPOINTMENT (OUTPATIENT)
Dept: MAMMOGRAPHY | Facility: CLINIC | Age: 35
End: 2025-04-07

## 2025-04-21 ENCOUNTER — NON-APPOINTMENT (OUTPATIENT)
Age: 35
End: 2025-04-21

## 2025-05-14 ENCOUNTER — APPOINTMENT (OUTPATIENT)
Dept: HUMAN REPRODUCTION | Facility: CLINIC | Age: 35
End: 2025-05-14